# Patient Record
Sex: MALE | Race: WHITE | NOT HISPANIC OR LATINO | Employment: UNEMPLOYED | URBAN - METROPOLITAN AREA
[De-identification: names, ages, dates, MRNs, and addresses within clinical notes are randomized per-mention and may not be internally consistent; named-entity substitution may affect disease eponyms.]

---

## 2021-10-28 ENCOUNTER — TRANSCRIBE ORDERS (OUTPATIENT)
Dept: ADMINISTRATIVE | Facility: HOSPITAL | Age: 61
End: 2021-10-28

## 2021-10-28 ENCOUNTER — LAB (OUTPATIENT)
Dept: LAB | Facility: HOSPITAL | Age: 61
End: 2021-10-28

## 2021-10-28 ENCOUNTER — HOSPITAL ENCOUNTER (OUTPATIENT)
Dept: GENERAL RADIOLOGY | Facility: HOSPITAL | Age: 61
Discharge: HOME | End: 2021-10-28

## 2021-10-28 DIAGNOSIS — R53.81 MALAISE AND FATIGUE: Primary | ICD-10-CM

## 2021-10-28 DIAGNOSIS — R73.9 ELEVATED BLOOD SUGAR: ICD-10-CM

## 2021-10-28 DIAGNOSIS — R06.02 SHORTNESS OF BREATH: ICD-10-CM

## 2021-10-28 DIAGNOSIS — R53.81 MALAISE AND FATIGUE: ICD-10-CM

## 2021-10-28 DIAGNOSIS — R07.9 CHEST PAIN, UNSPECIFIED TYPE: ICD-10-CM

## 2021-10-28 DIAGNOSIS — R53.83 MALAISE AND FATIGUE: ICD-10-CM

## 2021-10-28 DIAGNOSIS — R53.83 MALAISE AND FATIGUE: Primary | ICD-10-CM

## 2021-10-28 DIAGNOSIS — R07.9 CHEST PAIN, UNSPECIFIED TYPE: Primary | ICD-10-CM

## 2021-10-28 LAB
ALBUMIN SERPL-MCNC: 4.5 G/DL (ref 3.5–5.2)
ALBUMIN/GLOB SERPL: 1.6 G/DL
ALP SERPL-CCNC: 84 U/L (ref 39–117)
ALT SERPL W P-5'-P-CCNC: 18 U/L (ref 1–41)
ANION GAP SERPL CALCULATED.3IONS-SCNC: 11.6 MMOL/L (ref 5–15)
AST SERPL-CCNC: 21 U/L (ref 1–40)
BASOPHILS # BLD AUTO: 0.05 10*3/MM3 (ref 0–0.2)
BASOPHILS NFR BLD AUTO: 0.6 % (ref 0–1.5)
BILIRUB SERPL-MCNC: 0.3 MG/DL (ref 0–1.2)
BUN SERPL-MCNC: 19 MG/DL (ref 8–23)
BUN/CREAT SERPL: 19.4 (ref 7–25)
CALCIUM SPEC-SCNC: 9.3 MG/DL (ref 8.6–10.5)
CHLORIDE SERPL-SCNC: 105 MMOL/L (ref 98–107)
CHOLEST SERPL-MCNC: 225 MG/DL (ref 0–200)
CO2 SERPL-SCNC: 23.4 MMOL/L (ref 22–29)
CREAT SERPL-MCNC: 0.98 MG/DL (ref 0.76–1.27)
DEPRECATED RDW RBC AUTO: 47 FL (ref 37–54)
EOSINOPHIL # BLD AUTO: 0.07 10*3/MM3 (ref 0–0.4)
EOSINOPHIL NFR BLD AUTO: 0.9 % (ref 0.3–6.2)
ERYTHROCYTE [DISTWIDTH] IN BLOOD BY AUTOMATED COUNT: 14.6 % (ref 12.3–15.4)
GFR SERPL CREATININE-BSD FRML MDRD: 78 ML/MIN/1.73
GLOBULIN UR ELPH-MCNC: 2.8 GM/DL
GLUCOSE SERPL-MCNC: 106 MG/DL (ref 65–99)
HBA1C MFR BLD: 5.59 % (ref 4.8–5.6)
HCT VFR BLD AUTO: 46.8 % (ref 37.5–51)
HDLC SERPL-MCNC: 30 MG/DL (ref 40–60)
HGB BLD-MCNC: 15.8 G/DL (ref 13–17.7)
IMM GRANULOCYTES # BLD AUTO: 0.02 10*3/MM3 (ref 0–0.05)
IMM GRANULOCYTES NFR BLD AUTO: 0.3 % (ref 0–0.5)
LDLC SERPL CALC-MCNC: 147 MG/DL (ref 0–100)
LDLC/HDLC SERPL: 4.75 {RATIO}
LYMPHOCYTES # BLD AUTO: 2.25 10*3/MM3 (ref 0.7–3.1)
LYMPHOCYTES NFR BLD AUTO: 28.1 % (ref 19.6–45.3)
MCH RBC QN AUTO: 29.7 PG (ref 26.6–33)
MCHC RBC AUTO-ENTMCNC: 33.8 G/DL (ref 31.5–35.7)
MCV RBC AUTO: 88 FL (ref 79–97)
MONOCYTES # BLD AUTO: 0.67 10*3/MM3 (ref 0.1–0.9)
MONOCYTES NFR BLD AUTO: 8.4 % (ref 5–12)
NEUTROPHILS NFR BLD AUTO: 4.94 10*3/MM3 (ref 1.7–7)
NEUTROPHILS NFR BLD AUTO: 61.7 % (ref 42.7–76)
NRBC BLD AUTO-RTO: 0 /100 WBC (ref 0–0.2)
PLATELET # BLD AUTO: 171 10*3/MM3 (ref 140–450)
PMV BLD AUTO: 12.2 FL (ref 6–12)
POTASSIUM SERPL-SCNC: 4.1 MMOL/L (ref 3.5–5.2)
PROT SERPL-MCNC: 7.3 G/DL (ref 6–8.5)
PSA SERPL-MCNC: 1.48 NG/ML (ref 0–4)
RBC # BLD AUTO: 5.32 10*6/MM3 (ref 4.14–5.8)
SODIUM SERPL-SCNC: 140 MMOL/L (ref 136–145)
T4 FREE SERPL-MCNC: 1.35 NG/DL (ref 0.93–1.7)
TRIGL SERPL-MCNC: 262 MG/DL (ref 0–150)
TSH SERPL DL<=0.05 MIU/L-ACNC: 1.85 UIU/ML (ref 0.27–4.2)
VLDLC SERPL-MCNC: 48 MG/DL (ref 5–40)
WBC # BLD AUTO: 8 10*3/MM3 (ref 3.4–10.8)

## 2021-10-28 PROCEDURE — G0103 PSA SCREENING: HCPCS

## 2021-10-28 PROCEDURE — 85025 COMPLETE CBC W/AUTO DIFF WBC: CPT

## 2021-10-28 PROCEDURE — 83036 HEMOGLOBIN GLYCOSYLATED A1C: CPT

## 2021-10-28 PROCEDURE — 84439 ASSAY OF FREE THYROXINE: CPT

## 2021-10-28 PROCEDURE — 84443 ASSAY THYROID STIM HORMONE: CPT

## 2021-10-28 PROCEDURE — 80061 LIPID PANEL: CPT

## 2021-10-28 PROCEDURE — 80053 COMPREHEN METABOLIC PANEL: CPT

## 2021-10-28 PROCEDURE — 36415 COLL VENOUS BLD VENIPUNCTURE: CPT

## 2021-10-28 PROCEDURE — 71046 X-RAY EXAM CHEST 2 VIEWS: CPT

## 2021-11-10 ENCOUNTER — OFFICE VISIT (OUTPATIENT)
Dept: FAMILY MEDICINE CLINIC | Facility: CLINIC | Age: 61
End: 2021-11-10

## 2021-11-10 VITALS
WEIGHT: 177 LBS | SYSTOLIC BLOOD PRESSURE: 144 MMHG | BODY MASS INDEX: 27.78 KG/M2 | OXYGEN SATURATION: 97 % | TEMPERATURE: 98 F | HEIGHT: 67 IN | DIASTOLIC BLOOD PRESSURE: 80 MMHG | HEART RATE: 80 BPM

## 2021-11-10 DIAGNOSIS — E78.2 MIXED HYPERLIPIDEMIA: Chronic | ICD-10-CM

## 2021-11-10 DIAGNOSIS — F17.219 CIGARETTE NICOTINE DEPENDENCE WITH NICOTINE-INDUCED DISORDER: Chronic | ICD-10-CM

## 2021-11-10 DIAGNOSIS — J44.1 COPD WITH EXACERBATION (HCC): Primary | Chronic | ICD-10-CM

## 2021-11-10 DIAGNOSIS — K40.90 NON-RECURRENT UNILATERAL INGUINAL HERNIA WITHOUT OBSTRUCTION OR GANGRENE: Chronic | ICD-10-CM

## 2021-11-10 DIAGNOSIS — H61.92 LESION OF LEFT EARLOBE: Chronic | ICD-10-CM

## 2021-11-10 PROBLEM — K46.9 ABDOMINAL HERNIA: Chronic | Status: RESOLVED | Noted: 2021-11-10 | Resolved: 2021-11-10

## 2021-11-10 PROBLEM — K46.9 ABDOMINAL HERNIA: Chronic | Status: ACTIVE | Noted: 2021-11-10

## 2021-11-10 PROCEDURE — 99204 OFFICE O/P NEW MOD 45 MIN: CPT | Performed by: FAMILY MEDICINE

## 2021-11-10 NOTE — PROGRESS NOTES
"Chief Complaint  Establish Care, Hyperlipidemia (labs checked 10-), heart flutters (when eating), and Shortness of Breath (started in 2019 ,pt feels like its getting better)     Subjective          Vijay Mathew presents to [facility]  He is here today to establish relations as a new patient. He has a PMH significant for COPD and tobacco abuse. He is recently been seen by ARELIS Castillo. He does not have a past family history of cancer. He has smoked for over 40 years.     He has been having SOB and for several weeks. He did have covid a few months ago. He says that he feels the SOB with exertion. He denies chest pain or pressure.     He is complaining of a lesion behind his left ear this been present now for over 10 years.  He says he has a family history of these as well.    He is complaining of a swelling in his scrotal region that is been present now for the last for 5 years.  He says he started to have increasing pain and is wanting to consider having it repaired.  He denies issues with his bowels or intense pain.    The patient has no other complaints today and denies chest pain, shortness of breath, weakness, numbness, nausea, vomiting, diarrhea, dizziness or syncopal event.        Objective   Vital Signs:   /80 (BP Location: Left arm, Patient Position: Sitting, Cuff Size: Adult)   Pulse 80   Temp 98 °F (36.7 °C) (Temporal)   Ht 170.2 cm (67\")   Wt 80.3 kg (177 lb)   SpO2 97%   BMI 27.72 kg/m²     Physical Exam  Vitals reviewed.   Constitutional:       Appearance: Normal appearance. He is well-developed.   HENT:      Head: Normocephalic and atraumatic.        Comments: 7.5  Cm circular lesion     Right Ear: External ear normal.      Left Ear: External ear normal.      Mouth/Throat:      Pharynx: No oropharyngeal exudate.   Eyes:      Conjunctiva/sclera: Conjunctivae normal.      Pupils: Pupils are equal, round, and reactive to light.   Neck:      " Vascular: No carotid bruit.   Cardiovascular:      Rate and Rhythm: Normal rate and regular rhythm.      Heart sounds: No murmur heard.  No friction rub. No gallop.    Pulmonary:      Effort: Pulmonary effort is normal.      Breath sounds: Normal breath sounds. No wheezing or rhonchi.   Abdominal:      General: Bowel sounds are normal. There is no distension.      Palpations: Abdomen is soft.      Tenderness: There is no abdominal tenderness.   Genitourinary:         Comments: Enlarged scrotum    Skin:     General: Skin is warm and dry.   Neurological:      Mental Status: He is alert and oriented to person, place, and time.      Cranial Nerves: No cranial nerve deficit.      Motor: No weakness.   Psychiatric:         Mood and Affect: Mood and affect normal.         Behavior: Behavior normal.         Thought Content: Thought content normal.         Judgment: Judgment normal.        Result Review :     CMP    CMP 10/28/21   Glucose 106 (A)   BUN 19   Creatinine 0.98   eGFR Non African Am 78   Sodium 140   Potassium 4.1   Chloride 105   Calcium 9.3   Albumin 4.50   Total Bilirubin 0.3   Alkaline Phosphatase 84   AST (SGOT) 21   ALT (SGPT) 18   (A) Abnormal value            CBC    CBC 10/28/21   WBC 8.00   RBC 5.32   Hemoglobin 15.8   Hematocrit 46.8   MCV 88.0   MCH 29.7   MCHC 33.8   RDW 14.6   Platelets 171           Lipid Panel    Lipid Panel 10/28/21   Total Cholesterol 225 (A)   Triglycerides 262 (A)   HDL Cholesterol 30 (A)   VLDL Cholesterol 48 (A)   LDL Cholesterol  147 (A)   LDL/HDL Ratio 4.75   (A) Abnormal value            TSH    TSH 10/28/21   TSH 1.850                     Assessment and Plan    Diagnoses and all orders for this visit:    1. COPD with exacerbation (HCC) (Primary)  Assessment & Plan:  COPD is newly identified.  Counseled to avoid exposure to cigarette smoke.          2. Cigarette nicotine dependence with nicotine-induced disorder    3. Mixed hyperlipidemia  Assessment & Plan:  Lipid  abnormalities are improving with lifestyle modifications.  Nutritional counseling was provided.  Lipids will be reassessed in 3 months.    The 10-year ASCVD risk score (Olivehurstjohn TEJADA Jr., et al., 2013) is: 27.2%    Values used to calculate the score:      Age: 61 years      Sex: Male      Is Non- : No      Diabetic: No      Tobacco smoker: Yes      Systolic Blood Pressure: 144 mmHg      Is BP treated: No      HDL Cholesterol: 30 mg/dL      Total Cholesterol: 225 mg/dL        I spent 54 minutes caring for Vijay on this date of service. This time includes time spent by me in the following activities:preparing for the visit, reviewing tests, performing a medically appropriate examination and/or evaluation , counseling and educating the patient/family/caregiver, ordering medications, tests, or procedures and documenting information in the medical record  Follow Up   Return in about 1 month (around 12/10/2021).  Patient was given instructions and counseling regarding his condition or for health maintenance advice. Please see specific information pulled into the AVS if appropriate.

## 2021-11-10 NOTE — ASSESSMENT & PLAN NOTE
The patient's hernia has never been seen or treated by a a provider in the past.  He was educated about the risks of possible incarcerated bowel.  He verbalized understanding and says this is soon as he gets insurance he is willing to get it treated.

## 2021-11-10 NOTE — ASSESSMENT & PLAN NOTE
Lipid abnormalities are improving with lifestyle modifications.  Nutritional counseling was provided.  Lipids will be reassessed in 3 months.    The 10-year ASCVD risk score (Sunitha TEJADA Jr., et al., 2013) is: 27.2%    Values used to calculate the score:      Age: 61 years      Sex: Male      Is Non- : No      Diabetic: No      Tobacco smoker: Yes      Systolic Blood Pressure: 144 mmHg      Is BP treated: No      HDL Cholesterol: 30 mg/dL      Total Cholesterol: 225 mg/dL

## 2021-11-10 NOTE — ASSESSMENT & PLAN NOTE
Tobacco use is newly identified.  Smoking cessation counseling was provided.  Tobacco use will be reassessed at the next regular appointment.

## 2021-11-10 NOTE — ASSESSMENT & PLAN NOTE
The patient was educated about the different possible pathologies of his ear lesion.  He said he is willing to go see a general surgeon or ENT doctor once he gets his insurance started here in few days.

## 2021-12-08 ENCOUNTER — OFFICE VISIT (OUTPATIENT)
Dept: FAMILY MEDICINE CLINIC | Facility: CLINIC | Age: 61
End: 2021-12-08

## 2021-12-08 VITALS
OXYGEN SATURATION: 98 % | BODY MASS INDEX: 28.69 KG/M2 | HEIGHT: 67 IN | HEART RATE: 74 BPM | TEMPERATURE: 97.4 F | WEIGHT: 182.8 LBS | RESPIRATION RATE: 18 BRPM | DIASTOLIC BLOOD PRESSURE: 74 MMHG | SYSTOLIC BLOOD PRESSURE: 147 MMHG

## 2021-12-08 DIAGNOSIS — K40.90 NON-RECURRENT UNILATERAL INGUINAL HERNIA WITHOUT OBSTRUCTION OR GANGRENE: Primary | ICD-10-CM

## 2021-12-08 DIAGNOSIS — E78.2 MIXED HYPERLIPIDEMIA: Chronic | ICD-10-CM

## 2021-12-08 DIAGNOSIS — F17.219 CIGARETTE NICOTINE DEPENDENCE WITH NICOTINE-INDUCED DISORDER: Chronic | ICD-10-CM

## 2021-12-08 DIAGNOSIS — H61.92 LESION OF LEFT EARLOBE: Chronic | ICD-10-CM

## 2021-12-08 DIAGNOSIS — J44.9 COPD, MILD (HCC): Chronic | ICD-10-CM

## 2021-12-08 PROBLEM — J44.1 COPD WITH EXACERBATION (HCC): Chronic | Status: RESOLVED | Noted: 2021-11-10 | Resolved: 2021-12-08

## 2021-12-08 PROCEDURE — 99214 OFFICE O/P EST MOD 30 MIN: CPT | Performed by: FAMILY MEDICINE

## 2021-12-08 RX ORDER — LOSARTAN POTASSIUM 25 MG/1
25 TABLET ORAL DAILY
Qty: 30 TABLET | Refills: 2 | Status: SHIPPED | OUTPATIENT
Start: 2021-12-08 | End: 2021-12-31 | Stop reason: SDUPTHER

## 2021-12-08 RX ORDER — ATORVASTATIN CALCIUM 10 MG/1
10 TABLET, FILM COATED ORAL DAILY
Qty: 30 TABLET | Refills: 2 | Status: SHIPPED | OUTPATIENT
Start: 2021-12-08 | End: 2022-02-23 | Stop reason: SDUPTHER

## 2021-12-08 NOTE — PROGRESS NOTES
"Chief Complaint  Follow-up and Hyperlipidemia    Subjective          Vijay Mathew presents to [facility]  He is here today for a follow-up for his chronic medical conditions. He has a PMH significant for COPD and tobacco abuse. He is recently been seen by ARELIS Castillo. He does not have a past family history of cancer. He has smoked for over 40 years.      He is still complaining of a lesion behind his left ear this been present now for over 10 years.  He says he has a family history of these as well.     He is still complaining of a swelling in his scrotal region that is been present now for the last for 5 years.  He says he started to have increasing pain and is wanting to consider having it repaired.  He denies issues with his bowels or intense pain.    He is trying to cut back smoking.      The patient has no other complaints today and denies chest pain, shortness of breath, weakness, numbness, nausea, vomiting, diarrhea, dizziness or syncopal event.      Objective   Vital Signs:   /74 (BP Location: Left arm, Patient Position: Sitting)   Pulse 74   Temp 97.4 °F (36.3 °C)   Resp 18   Ht 170.2 cm (67\")   Wt 82.9 kg (182 lb 12.8 oz)   SpO2 98%   BMI 28.63 kg/m²     Physical Exam  Vitals reviewed.   Constitutional:       Appearance: Normal appearance. He is well-developed.   HENT:      Head: Normocephalic and atraumatic.      Right Ear: External ear normal.      Left Ear: External ear normal.      Mouth/Throat:      Pharynx: No oropharyngeal exudate.   Eyes:      Conjunctiva/sclera: Conjunctivae normal.      Pupils: Pupils are equal, round, and reactive to light.   Neck:      Vascular: No carotid bruit.   Cardiovascular:      Rate and Rhythm: Normal rate and regular rhythm.      Heart sounds: No murmur heard.  No friction rub. No gallop.    Pulmonary:      Effort: Pulmonary effort is normal.      Breath sounds: Normal breath sounds. No wheezing or rhonchi. "   Abdominal:      General: Bowel sounds are normal. There is no distension.      Palpations: Abdomen is soft.      Tenderness: There is no abdominal tenderness.   Skin:     General: Skin is warm and dry.   Neurological:      Mental Status: He is alert and oriented to person, place, and time.      Cranial Nerves: No cranial nerve deficit.      Motor: No weakness.   Psychiatric:         Mood and Affect: Mood and affect normal.         Behavior: Behavior normal.         Thought Content: Thought content normal.         Judgment: Judgment normal.        Result Review :     CMP    CMP 10/28/21   Glucose 106 (A)   BUN 19   Creatinine 0.98   eGFR Non African Am 78   Sodium 140   Potassium 4.1   Chloride 105   Calcium 9.3   Albumin 4.50   Total Bilirubin 0.3   Alkaline Phosphatase 84   AST (SGOT) 21   ALT (SGPT) 18   (A) Abnormal value            CBC    CBC 10/28/21   WBC 8.00   RBC 5.32   Hemoglobin 15.8   Hematocrit 46.8   MCV 88.0   MCH 29.7   MCHC 33.8   RDW 14.6   Platelets 171           Lipid Panel    Lipid Panel 10/28/21   Total Cholesterol 225 (A)   Triglycerides 262 (A)   HDL Cholesterol 30 (A)   VLDL Cholesterol 48 (A)   LDL Cholesterol  147 (A)   LDL/HDL Ratio 4.75   (A) Abnormal value            TSH    TSH 10/28/21   TSH 1.850                     Assessment and Plan    Diagnoses and all orders for this visit:    1. Non-recurrent unilateral inguinal hernia without obstruction or gangrene (Primary)  Assessment & Plan:  He is amendable to see general surgery for evaluation and repair of his hernia. A referral was placed to Dr Cline.     Orders:  -     Cancel: Ambulatory Referral to General Surgery  -     Ambulatory Referral to General Surgery    2. Mixed hyperlipidemia  Assessment & Plan:  Lipid abnormalities are newly identified.  He was started on atorvastatin 10 mg daily.  Lipids will be reassessed in 3 months.    The 10-year ASCVD risk score (Everett MALLORY Jr., et al., 2013) is: 28%    Values used to calculate the  score:      Age: 61 years      Sex: Male      Is Non- : No      Diabetic: No      Tobacco smoker: Yes      Systolic Blood Pressure: 147 mmHg      Is BP treated: No      HDL Cholesterol: 30 mg/dL      Total Cholesterol: 225 mg/dL        3. Cigarette nicotine dependence with nicotine-induced disorder  Assessment & Plan:  Tobacco use is improving with lifestyle modifications.  Smoking cessation counseling was provided.  Tobacco use will be reassessed at the next regular appointment.      4. COPD, mild (HCC)  Assessment & Plan:  COPD is improving with lifestyle modifications.  COPD information handout given.          5. Lesion of left earlobe  Assessment & Plan:  He was given a referral to general surgery for further medical evaluation and management.     Orders:  -     Ambulatory Referral to General Surgery    Other orders  -     atorvastatin (LIPITOR) 10 MG tablet; Take 1 tablet by mouth Daily.  Dispense: 30 tablet; Refill: 2  -     losartan (Cozaar) 25 MG tablet; Take 1 tablet by mouth Daily.  Dispense: 30 tablet; Refill: 2      Follow Up   Return in about 6 weeks (around 1/19/2022).  Patient was given instructions and counseling regarding his condition or for health maintenance advice. Please see specific information pulled into the AVS if appropriate.

## 2021-12-08 NOTE — ASSESSMENT & PLAN NOTE
Lipid abnormalities are newly identified.  He was started on atorvastatin 10 mg daily.  Lipids will be reassessed in 3 months.    The 10-year ASCVD risk score (Sunitha TEJADA Jr., et al., 2013) is: 28%    Values used to calculate the score:      Age: 61 years      Sex: Male      Is Non- : No      Diabetic: No      Tobacco smoker: Yes      Systolic Blood Pressure: 147 mmHg      Is BP treated: No      HDL Cholesterol: 30 mg/dL      Total Cholesterol: 225 mg/dL

## 2021-12-08 NOTE — ASSESSMENT & PLAN NOTE
He is amendable to see general surgery for evaluation and repair of his hernia. A referral was placed to Dr Cline.

## 2021-12-31 ENCOUNTER — TELEPHONE (OUTPATIENT)
Dept: FAMILY MEDICINE CLINIC | Facility: CLINIC | Age: 61
End: 2021-12-31

## 2021-12-31 RX ORDER — LOSARTAN POTASSIUM 50 MG/1
50 TABLET ORAL DAILY
Qty: 90 TABLET | Refills: 1 | Status: SHIPPED | OUTPATIENT
Start: 2021-12-31 | End: 2022-01-26 | Stop reason: SDUPTHER

## 2021-12-31 NOTE — TELEPHONE ENCOUNTER
The patient called and stated he is taking two 25 mg losartan daily and is needing a new prescription.

## 2022-01-21 ENCOUNTER — PREP FOR SURGERY (OUTPATIENT)
Dept: OTHER | Facility: HOSPITAL | Age: 62
End: 2022-01-21

## 2022-01-21 ENCOUNTER — OFFICE VISIT (OUTPATIENT)
Dept: SURGERY | Facility: CLINIC | Age: 62
End: 2022-01-21

## 2022-01-21 VITALS — RESPIRATION RATE: 16 BRPM | WEIGHT: 179 LBS | HEIGHT: 67 IN | BODY MASS INDEX: 28.09 KG/M2

## 2022-01-21 DIAGNOSIS — K40.90 LEFT INGUINAL HERNIA: Primary | ICD-10-CM

## 2022-01-21 DIAGNOSIS — R22.1 MASS OF LEFT SIDE OF NECK: ICD-10-CM

## 2022-01-21 PROCEDURE — 99203 OFFICE O/P NEW LOW 30 MIN: CPT | Performed by: SURGERY

## 2022-01-21 RX ORDER — CEFAZOLIN SODIUM 2 G/100ML
2 INJECTION, SOLUTION INTRAVENOUS ONCE
Status: CANCELLED | OUTPATIENT
Start: 2022-01-21 | End: 2022-01-21

## 2022-01-21 NOTE — PROGRESS NOTES
Chief Complaint:  Hernia (inguinal)    Primary Care Provider: Amaya Robbins DO    Referring Provider: Amaya Robbins DO    History of Present Illness  Vijay Mathew is a 62 y.o. male referred by Amaya Robbins DO for an inguinal hernia.  The patient has a very large left inguinal hernia that has been present for at least 30 years.  Patient does not have a lot of pain where the bulge is located.  The bulge used to be reducible but for the past 3 to 4 months the patient has been and able to get the bulge to reduce.  No obstructive symptoms.  The bulge is so large it makes it difficult for the patient to urinate.  He has to wear very loose pants because the bulge is so large.  No prior abdominal surgeries.  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  No known heart problems besides high blood pressure.  No heart attack or stroke.    Allergies: Patient has no known allergies.    Outpatient Medications Marked as Taking for the 1/21/22 encounter (Office Visit) with Brad Cline MD   Medication Sig Dispense Refill   • Ascorbic Acid (VITAMIN C PO) Take  by mouth.     • atorvastatin (LIPITOR) 10 MG tablet Take 1 tablet by mouth Daily. 30 tablet 2   • losartan (Cozaar) 50 MG tablet Take 1 tablet by mouth Daily. 90 tablet 1   • Misc Natural Products (APPLE CIDER VINEGAR DIET PO) Take  by mouth.     • Multiple Vitamins-Minerals (EMERGEN-C IMMUNE PLUS PO) Take  by mouth.     • VITAMIN D PO Take  by mouth.     • ZINC CITRATE PO Take  by mouth.         Past Medical History:   • Broken bones   • History of deafness   • History of kidney stones   • History of rheumatic fever   • Hyperlipidemia   • Shortness of breath   • Sinus trouble        No past surgical history on file.    Family History: History reviewed. No pertinent family history.     Social History:  Social History     Tobacco Use   • Smoking status: Current  "Every Day Smoker     Packs/day: 1.50     Years: 45.00     Pack years: 67.50     Start date: 1977   • Smokeless tobacco: Never Used   Substance Use Topics   • Alcohol use: Not Currently       Objective     Vital Signs:  Resp 16   Ht 170.2 cm (67\")   Wt 81.2 kg (179 lb)   BMI 28.04 kg/m²   • Constitutional: here alone, alert, no acute distress, reliable historian  • HENT:  NCAT, no visible deformities or lesions.  Large soft subcutaneous mass just posterior to left ear  • Eyes:  sclerae clear, conjunctivae clear, EOMI  • Neck:  normal appearance, no masses, trachea midline  • Respiratory:  breathing not labored, respiratory effort appears normal  • Cardiovascular:  heart regular rate  • Abdomen:  soft, nontender, nondistended, Extremely large bulge at the left inguinal area that extends into the left scrotum.  The bulge is so large that I cannot palpate the left testicle.  The bulge is soft and the bulge is not reducible  • Skin and subcutaneous tissue:  no visible concerning rashes or lesions, no jaundice  • Musculoskeletal: moving all extremities symmetrically and purposefully  • Neurologic:  no obvious motor or sensory deficits, normal gait, able to stand without difficulty, cerebellar function without any obvious abnormalities, alert & oriented x 3, speech clear  • Psychiatric:  judgment and insight intact, mood normal, affect appropriate, cooperative    Assessment:  1. Left inguinoscrotal hernia  2. Mass of left side of neck - benign exam characteristics    Plan:  Robotic left inguinal hernia repair.  Will excise left sided neck mass at a later time after the patient recovers from inguinal hernia surgery.  Although the inguinal scrotal mass has characteristics consistent with a large hernia, given the very large size of the hernia will evaluate with a CT pelvis to rule out mass/any finding other than large hernia.    Discussion: Indications, options, risk, benefits, and expected outcomes of planned surgery " were discussed with the patient and he agrees to proceed.  Patient fully understands that I may need to remove his left testicle given the extremely large size of the hernia.  He also understands there is a possibility the surgery may need to be converted to an open procedure.    Brad Cline MD  01/21/2022    Electronically signed by Brad Cline MD, 01/21/22, 2:29 PM EST.

## 2022-01-26 ENCOUNTER — OFFICE VISIT (OUTPATIENT)
Dept: FAMILY MEDICINE CLINIC | Facility: CLINIC | Age: 62
End: 2022-01-26

## 2022-01-26 VITALS
WEIGHT: 179.6 LBS | HEART RATE: 82 BPM | OXYGEN SATURATION: 98 % | HEIGHT: 67 IN | TEMPERATURE: 97.2 F | SYSTOLIC BLOOD PRESSURE: 142 MMHG | DIASTOLIC BLOOD PRESSURE: 74 MMHG | RESPIRATION RATE: 18 BRPM | BODY MASS INDEX: 28.19 KG/M2

## 2022-01-26 DIAGNOSIS — E78.2 MIXED HYPERLIPIDEMIA: Chronic | ICD-10-CM

## 2022-01-26 DIAGNOSIS — F17.219 CIGARETTE NICOTINE DEPENDENCE WITH NICOTINE-INDUCED DISORDER: Chronic | ICD-10-CM

## 2022-01-26 DIAGNOSIS — I10 PRIMARY HYPERTENSION: Primary | Chronic | ICD-10-CM

## 2022-01-26 PROCEDURE — 99214 OFFICE O/P EST MOD 30 MIN: CPT | Performed by: FAMILY MEDICINE

## 2022-01-26 RX ORDER — LOSARTAN POTASSIUM 100 MG/1
100 TABLET ORAL DAILY
Qty: 90 TABLET | Refills: 1 | Status: SHIPPED | OUTPATIENT
Start: 2022-01-26 | End: 2022-04-07 | Stop reason: SDUPTHER

## 2022-01-26 NOTE — PROGRESS NOTES
"Chief Complaint  Hypertension    Subjective          Vijay Mathew presents to [facility]  He is here today for a follow-up for his chronic medical conditions. He has a PMH significant for COPD and tobacco abuse. He is recently been seen by ARELIS Castillo. He does not have a past family history of cancer. He has smoked for over 40 years.      His home blood pressure is running 135/85.     He is trying to cut back smoking.      The patient has no other complaints today and denies chest pain, shortness of breath, weakness, numbness, nausea, vomiting, diarrhea, dizziness or syncopal event.      Objective   Vital Signs:   /74 (BP Location: Left arm, Patient Position: Sitting)   Pulse 82   Temp 97.2 °F (36.2 °C)   Resp 18   Ht 170.2 cm (67.01\")   Wt 81.5 kg (179 lb 9.6 oz)   SpO2 98%   BMI 28.12 kg/m²     Physical Exam  Vitals reviewed.   Constitutional:       Appearance: Normal appearance. He is well-developed.   HENT:      Head: Normocephalic and atraumatic.      Right Ear: External ear normal.      Left Ear: External ear normal.      Mouth/Throat:      Pharynx: No oropharyngeal exudate.   Eyes:      Conjunctiva/sclera: Conjunctivae normal.      Pupils: Pupils are equal, round, and reactive to light.   Neck:      Vascular: No carotid bruit.   Cardiovascular:      Rate and Rhythm: Normal rate and regular rhythm.      Heart sounds: No murmur heard.  No friction rub. No gallop.    Pulmonary:      Effort: Pulmonary effort is normal.      Breath sounds: Normal breath sounds. No wheezing or rhonchi.   Abdominal:      General: Bowel sounds are normal. There is no distension.      Palpations: Abdomen is soft.      Tenderness: There is no abdominal tenderness.   Skin:     General: Skin is warm and dry.   Neurological:      Mental Status: He is alert and oriented to person, place, and time.      Cranial Nerves: No cranial nerve deficit.      Motor: No weakness.   Psychiatric: "         Mood and Affect: Mood and affect normal.         Behavior: Behavior normal.         Thought Content: Thought content normal.         Judgment: Judgment normal.        Result Review :     CMP    CMP 10/28/21   Glucose 106 (A)   BUN 19   Creatinine 0.98   eGFR Non African Am 78   Sodium 140   Potassium 4.1   Chloride 105   Calcium 9.3   Albumin 4.50   Total Bilirubin 0.3   Alkaline Phosphatase 84   AST (SGOT) 21   ALT (SGPT) 18   (A) Abnormal value            CBC    CBC 10/28/21   WBC 8.00   RBC 5.32   Hemoglobin 15.8   Hematocrit 46.8   MCV 88.0   MCH 29.7   MCHC 33.8   RDW 14.6   Platelets 171           Lipid Panel    Lipid Panel 10/28/21   Total Cholesterol 225 (A)   Triglycerides 262 (A)   HDL Cholesterol 30 (A)   VLDL Cholesterol 48 (A)   LDL Cholesterol  147 (A)   LDL/HDL Ratio 4.75   (A) Abnormal value            TSH    TSH 10/28/21   TSH 1.850                     Assessment and Plan    Diagnoses and all orders for this visit:    1. Primary hypertension (Primary)  Assessment & Plan:  Hypertension is improving with treatment.  Continue current treatment regimen.  Dietary sodium restriction.  Weight loss.  Blood pressure will be reassessed at the next regular appointment.      2. Mixed hyperlipidemia  Assessment & Plan:  Lipid abnormalities are improving with treatment.  Nutritional counseling was provided. and Pharmacotherapy as ordered.  Lipids will be reassessed in 3 months.      3. Cigarette nicotine dependence with nicotine-induced disorder  Assessment & Plan:  Tobacco use is unchanged.  Smoking cessation counseling was provided.  Tobacco use will be reassessed in 3 months.    He was encouraged to stop smoking when he has his surgery.       Other orders  -     losartan (Cozaar) 100 MG tablet; Take 1 tablet by mouth Daily.  Dispense: 90 tablet; Refill: 1      Follow Up   No follow-ups on file.  Patient was given instructions and counseling regarding his condition or for health maintenance advice.  Please see specific information pulled into the AVS if appropriate.

## 2022-01-26 NOTE — ASSESSMENT & PLAN NOTE
Tobacco use is unchanged.  Smoking cessation counseling was provided.  Tobacco use will be reassessed in 3 months.    He was encouraged to stop smoking when he has his surgery.

## 2022-02-07 ENCOUNTER — TELEPHONE (OUTPATIENT)
Dept: SURGERY | Facility: CLINIC | Age: 62
End: 2022-02-07

## 2022-02-07 DIAGNOSIS — K40.90 LEFT INGUINAL HERNIA: Primary | ICD-10-CM

## 2022-02-07 NOTE — TELEPHONE ENCOUNTER
CT scan scheduled on 2-22-22 at 2pm at [facility]. Pt is to be NPO for 2 hours prior to scan. Tried calling pt, no answer. Cannot leave message.

## 2022-02-18 ENCOUNTER — TELEPHONE (OUTPATIENT)
Dept: SURGERY | Facility: CLINIC | Age: 62
End: 2022-02-18

## 2022-02-18 NOTE — TELEPHONE ENCOUNTER
Patient had Covid vaccine on 01/27 and second scheduled 02/24.  Should he go ahead and get it, or wait, since he is scheduled for surgery.

## 2022-02-22 ENCOUNTER — HOSPITAL ENCOUNTER (OUTPATIENT)
Dept: CT IMAGING | Facility: HOSPITAL | Age: 62
Discharge: HOME | End: 2022-02-22
Admitting: SURGERY

## 2022-02-22 DIAGNOSIS — K40.90 LEFT INGUINAL HERNIA: ICD-10-CM

## 2022-02-22 LAB — CREAT BLDA-MCNC: 1 MG/DL

## 2022-02-22 PROCEDURE — 82565 ASSAY OF CREATININE: CPT

## 2022-02-22 PROCEDURE — 0 IOPAMIDOL PER 1 ML: Performed by: SURGERY

## 2022-02-22 PROCEDURE — 72193 CT PELVIS W/DYE: CPT

## 2022-02-22 RX ADMIN — IOPAMIDOL 100 ML: 755 INJECTION, SOLUTION INTRAVENOUS at 14:38

## 2022-02-23 RX ORDER — ATORVASTATIN CALCIUM 10 MG/1
10 TABLET, FILM COATED ORAL DAILY
Qty: 30 TABLET | Refills: 2 | Status: SHIPPED | OUTPATIENT
Start: 2022-02-23 | End: 2022-05-27 | Stop reason: SDUPTHER

## 2022-02-23 NOTE — TELEPHONE ENCOUNTER
Caller: Vijay Mathew    Relationship: Self    Best call back number: [phone]    Requested Prescriptions:   Requested Prescriptions     Pending Prescriptions Disp Refills   • atorvastatin (LIPITOR) 10 MG tablet 30 tablet 2     Sig: Take 1 tablet by mouth Daily.        Pharmacy where request should be sent:    [place], [place] - [address] [place] [address] DRIVE - [phone]  - [phone] FX  [phone]    Does the patient have less than a 3 day supply:  [x] Yes  [] No    Seamus Estrada Rep   02/23/22 16:01 EST

## 2022-02-28 ENCOUNTER — PRE-ADMISSION TESTING (OUTPATIENT)
Dept: PREADMISSION TESTING | Facility: HOSPITAL | Age: 62
End: 2022-02-28

## 2022-02-28 ENCOUNTER — LAB (OUTPATIENT)
Dept: LAB | Facility: HOSPITAL | Age: 62
End: 2022-02-28

## 2022-02-28 VITALS
WEIGHT: 183.2 LBS | SYSTOLIC BLOOD PRESSURE: 122 MMHG | OXYGEN SATURATION: 97 % | TEMPERATURE: 98.5 F | HEART RATE: 87 BPM | HEIGHT: 67 IN | BODY MASS INDEX: 28.75 KG/M2 | DIASTOLIC BLOOD PRESSURE: 74 MMHG | RESPIRATION RATE: 16 BRPM

## 2022-02-28 DIAGNOSIS — K40.90 LEFT INGUINAL HERNIA: ICD-10-CM

## 2022-02-28 DIAGNOSIS — Z01.818 PREOP TESTING: Primary | ICD-10-CM

## 2022-02-28 LAB — QT INTERVAL: 364 MS

## 2022-02-28 PROCEDURE — 93005 ELECTROCARDIOGRAM TRACING: CPT

## 2022-02-28 PROCEDURE — 93010 ELECTROCARDIOGRAM REPORT: CPT | Performed by: INTERNAL MEDICINE

## 2022-02-28 PROCEDURE — U0004 COV-19 TEST NON-CDC HGH THRU: HCPCS

## 2022-02-28 PROCEDURE — C9803 HOPD COVID-19 SPEC COLLECT: HCPCS

## 2022-02-28 RX ORDER — ASPIRIN 81 MG/1
81 TABLET ORAL DAILY PRN
COMMUNITY
End: 2022-04-07

## 2022-03-01 LAB — SARS-COV-2 RNA PNL SPEC NAA+PROBE: NOT DETECTED

## 2022-03-04 ENCOUNTER — HOSPITAL ENCOUNTER (OUTPATIENT)
Facility: HOSPITAL | Age: 62
Discharge: HOME | End: 2022-03-04
Attending: SURGERY | Admitting: SURGERY

## 2022-03-04 ENCOUNTER — ANESTHESIA EVENT (OUTPATIENT)
Dept: PERIOP | Facility: HOSPITAL | Age: 62
End: 2022-03-04

## 2022-03-04 ENCOUNTER — ANESTHESIA (OUTPATIENT)
Dept: PERIOP | Facility: HOSPITAL | Age: 62
End: 2022-03-04

## 2022-03-04 VITALS
BODY MASS INDEX: 27.61 KG/M2 | OXYGEN SATURATION: 96 % | DIASTOLIC BLOOD PRESSURE: 80 MMHG | SYSTOLIC BLOOD PRESSURE: 133 MMHG | WEIGHT: 175.93 LBS | HEIGHT: 67 IN | TEMPERATURE: 97.3 F | RESPIRATION RATE: 15 BRPM | HEART RATE: 80 BPM

## 2022-03-04 DIAGNOSIS — K40.90 LEFT INGUINAL HERNIA: ICD-10-CM

## 2022-03-04 PROCEDURE — 49650 LAP ING HERNIA REPAIR INIT: CPT | Performed by: SURGERY

## 2022-03-04 PROCEDURE — C1781 MESH (IMPLANTABLE): HCPCS | Performed by: SURGERY

## 2022-03-04 PROCEDURE — 25010000002 CEFAZOLIN PER 500 MG: Performed by: NURSE ANESTHETIST, CERTIFIED REGISTERED

## 2022-03-04 PROCEDURE — C1889 IMPLANT/INSERT DEVICE, NOC: HCPCS | Performed by: SURGERY

## 2022-03-04 PROCEDURE — 25010000002 HYDROMORPHONE PER 4 MG: Performed by: NURSE ANESTHETIST, CERTIFIED REGISTERED

## 2022-03-04 PROCEDURE — 25010000002 MIDAZOLAM PER 1 MG: Performed by: ANESTHESIOLOGY

## 2022-03-04 PROCEDURE — 25010000002 PROPOFOL 10 MG/ML EMULSION: Performed by: NURSE ANESTHETIST, CERTIFIED REGISTERED

## 2022-03-04 PROCEDURE — 25010000002 DEXAMETHASONE PER 1 MG: Performed by: NURSE ANESTHETIST, CERTIFIED REGISTERED

## 2022-03-04 PROCEDURE — 25010000002 CEFAZOLIN IN DEXTROSE 2-4 GM/100ML-% SOLUTION: Performed by: SURGERY

## 2022-03-04 PROCEDURE — 25010000002 ONDANSETRON PER 1 MG: Performed by: NURSE ANESTHETIST, CERTIFIED REGISTERED

## 2022-03-04 PROCEDURE — 49650 LAP ING HERNIA REPAIR INIT: CPT | Performed by: SPECIALIST/TECHNOLOGIST, OTHER

## 2022-03-04 DEVICE — MESH PROGRIP LAP S/FIX ABS 10X15CM BX/2: Type: IMPLANTABLE DEVICE | Site: INGUINAL | Status: FUNCTIONAL

## 2022-03-04 DEVICE — MESH LAP PROGRIP SLF/FIX FLT/SHET 12X16CM BX/1: Type: IMPLANTABLE DEVICE | Site: INGUINAL | Status: FUNCTIONAL

## 2022-03-04 DEVICE — ABSORBABLE WOUND CLOSURE DEVICE
Type: IMPLANTABLE DEVICE | Site: INGUINAL | Status: FUNCTIONAL
Brand: V-LOC 180

## 2022-03-04 RX ORDER — HYDROMORPHONE HCL 110MG/55ML
PATIENT CONTROLLED ANALGESIA SYRINGE INTRAVENOUS AS NEEDED
Status: DISCONTINUED | OUTPATIENT
Start: 2022-03-04 | End: 2022-03-04 | Stop reason: SURG

## 2022-03-04 RX ORDER — DEXAMETHASONE SODIUM PHOSPHATE 4 MG/ML
INJECTION, SOLUTION INTRA-ARTICULAR; INTRALESIONAL; INTRAMUSCULAR; INTRAVENOUS; SOFT TISSUE AS NEEDED
Status: DISCONTINUED | OUTPATIENT
Start: 2022-03-04 | End: 2022-03-04 | Stop reason: SURG

## 2022-03-04 RX ORDER — MAGNESIUM HYDROXIDE 1200 MG/15ML
LIQUID ORAL AS NEEDED
Status: DISCONTINUED | OUTPATIENT
Start: 2022-03-04 | End: 2022-03-04 | Stop reason: HOSPADM

## 2022-03-04 RX ORDER — OXYCODONE HYDROCHLORIDE AND ACETAMINOPHEN 5; 325 MG/1; MG/1
1-2 TABLET ORAL EVERY 4 HOURS PRN
Qty: 20 TABLET | Refills: 0 | Status: SHIPPED | OUTPATIENT
Start: 2022-03-04 | End: 2022-04-07

## 2022-03-04 RX ORDER — CEPHALEXIN 500 MG/1
500 CAPSULE ORAL 3 TIMES DAILY
Qty: 21 CAPSULE | Refills: 0 | Status: SHIPPED | OUTPATIENT
Start: 2022-03-04 | End: 2022-03-11

## 2022-03-04 RX ORDER — ONDANSETRON 4 MG/1
4 TABLET, ORALLY DISINTEGRATING ORAL EVERY 4 HOURS PRN
Qty: 10 TABLET | Refills: 0 | Status: SHIPPED | OUTPATIENT
Start: 2022-03-04 | End: 2022-04-07

## 2022-03-04 RX ORDER — CEFAZOLIN SODIUM 2 G/100ML
2 INJECTION, SOLUTION INTRAVENOUS ONCE
Status: DISCONTINUED | OUTPATIENT
Start: 2022-03-04 | End: 2022-03-04 | Stop reason: HOSPADM

## 2022-03-04 RX ORDER — BUPIVACAINE HYDROCHLORIDE 2.5 MG/ML
INJECTION, SOLUTION EPIDURAL; INFILTRATION; INTRACAUDAL AS NEEDED
Status: DISCONTINUED | OUTPATIENT
Start: 2022-03-04 | End: 2022-03-04 | Stop reason: HOSPADM

## 2022-03-04 RX ORDER — ROCURONIUM BROMIDE 10 MG/ML
INJECTION, SOLUTION INTRAVENOUS AS NEEDED
Status: DISCONTINUED | OUTPATIENT
Start: 2022-03-04 | End: 2022-03-04 | Stop reason: SURG

## 2022-03-04 RX ORDER — MIDAZOLAM HYDROCHLORIDE 1 MG/ML
2 INJECTION INTRAMUSCULAR; INTRAVENOUS ONCE
Status: COMPLETED | OUTPATIENT
Start: 2022-03-04 | End: 2022-03-04

## 2022-03-04 RX ORDER — CEFAZOLIN SODIUM 1 G/3ML
INJECTION, POWDER, FOR SOLUTION INTRAMUSCULAR; INTRAVENOUS AS NEEDED
Status: DISCONTINUED | OUTPATIENT
Start: 2022-03-04 | End: 2022-03-04 | Stop reason: SURG

## 2022-03-04 RX ORDER — ACETAMINOPHEN 500 MG
1000 TABLET ORAL ONCE
Status: COMPLETED | OUTPATIENT
Start: 2022-03-04 | End: 2022-03-04

## 2022-03-04 RX ORDER — PROMETHAZINE HYDROCHLORIDE 12.5 MG/1
25 TABLET ORAL ONCE AS NEEDED
Status: DISCONTINUED | OUTPATIENT
Start: 2022-03-04 | End: 2022-03-04 | Stop reason: HOSPADM

## 2022-03-04 RX ORDER — GLYCOPYRROLATE 0.2 MG/ML
INJECTION INTRAMUSCULAR; INTRAVENOUS AS NEEDED
Status: DISCONTINUED | OUTPATIENT
Start: 2022-03-04 | End: 2022-03-04 | Stop reason: SURG

## 2022-03-04 RX ORDER — ONDANSETRON 2 MG/ML
INJECTION INTRAMUSCULAR; INTRAVENOUS AS NEEDED
Status: DISCONTINUED | OUTPATIENT
Start: 2022-03-04 | End: 2022-03-04 | Stop reason: SURG

## 2022-03-04 RX ORDER — OXYCODONE HYDROCHLORIDE 5 MG/1
5 TABLET ORAL
Status: DISCONTINUED | OUTPATIENT
Start: 2022-03-04 | End: 2022-03-04 | Stop reason: HOSPADM

## 2022-03-04 RX ORDER — PROMETHAZINE HYDROCHLORIDE 25 MG/1
25 SUPPOSITORY RECTAL ONCE AS NEEDED
Status: DISCONTINUED | OUTPATIENT
Start: 2022-03-04 | End: 2022-03-04 | Stop reason: HOSPADM

## 2022-03-04 RX ORDER — PHENYLEPHRINE HCL IN 0.9% NACL 1 MG/10 ML
SYRINGE (ML) INTRAVENOUS AS NEEDED
Status: DISCONTINUED | OUTPATIENT
Start: 2022-03-04 | End: 2022-03-04 | Stop reason: SURG

## 2022-03-04 RX ORDER — PROPOFOL 10 MG/ML
VIAL (ML) INTRAVENOUS AS NEEDED
Status: DISCONTINUED | OUTPATIENT
Start: 2022-03-04 | End: 2022-03-04 | Stop reason: SURG

## 2022-03-04 RX ORDER — MEPERIDINE HYDROCHLORIDE 25 MG/ML
12.5 INJECTION INTRAMUSCULAR; INTRAVENOUS; SUBCUTANEOUS
Status: DISCONTINUED | OUTPATIENT
Start: 2022-03-04 | End: 2022-03-04 | Stop reason: HOSPADM

## 2022-03-04 RX ORDER — SODIUM CHLORIDE, SODIUM LACTATE, POTASSIUM CHLORIDE, CALCIUM CHLORIDE 600; 310; 30; 20 MG/100ML; MG/100ML; MG/100ML; MG/100ML
9 INJECTION, SOLUTION INTRAVENOUS CONTINUOUS PRN
Status: DISCONTINUED | OUTPATIENT
Start: 2022-03-04 | End: 2022-03-04 | Stop reason: HOSPADM

## 2022-03-04 RX ORDER — LIDOCAINE HYDROCHLORIDE 20 MG/ML
INJECTION, SOLUTION INFILTRATION; PERINEURAL AS NEEDED
Status: DISCONTINUED | OUTPATIENT
Start: 2022-03-04 | End: 2022-03-04 | Stop reason: SURG

## 2022-03-04 RX ORDER — ONDANSETRON 2 MG/ML
4 INJECTION INTRAMUSCULAR; INTRAVENOUS ONCE AS NEEDED
Status: DISCONTINUED | OUTPATIENT
Start: 2022-03-04 | End: 2022-03-04 | Stop reason: HOSPADM

## 2022-03-04 RX ADMIN — CEFAZOLIN SODIUM 2 G: 1 INJECTION, POWDER, FOR SOLUTION INTRAMUSCULAR; INTRAVENOUS at 07:29

## 2022-03-04 RX ADMIN — Medication 100 MCG: at 07:48

## 2022-03-04 RX ADMIN — ROCURONIUM BROMIDE 10 MG: 10 INJECTION INTRAVENOUS at 10:15

## 2022-03-04 RX ADMIN — LIDOCAINE HYDROCHLORIDE 100 MG: 20 INJECTION, SOLUTION INFILTRATION; PERINEURAL at 07:34

## 2022-03-04 RX ADMIN — Medication 100 MCG: at 08:03

## 2022-03-04 RX ADMIN — PROPOFOL 200 MG: 10 INJECTION, EMULSION INTRAVENOUS at 07:34

## 2022-03-04 RX ADMIN — LIDOCAINE HYDROCHLORIDE 50 MG: 20 INJECTION, SOLUTION INFILTRATION; PERINEURAL at 10:01

## 2022-03-04 RX ADMIN — ROCURONIUM BROMIDE 20 MG: 10 INJECTION INTRAVENOUS at 08:31

## 2022-03-04 RX ADMIN — ACETAMINOPHEN 1000 MG: 500 TABLET ORAL at 07:04

## 2022-03-04 RX ADMIN — MIDAZOLAM HYDROCHLORIDE 2 MG: 1 INJECTION, SOLUTION INTRAMUSCULAR; INTRAVENOUS at 07:17

## 2022-03-04 RX ADMIN — ROCURONIUM BROMIDE 10 MG: 10 INJECTION INTRAVENOUS at 09:37

## 2022-03-04 RX ADMIN — SODIUM CHLORIDE, POTASSIUM CHLORIDE, SODIUM LACTATE AND CALCIUM CHLORIDE 9 ML/HR: 600; 310; 30; 20 INJECTION, SOLUTION INTRAVENOUS at 07:17

## 2022-03-04 RX ADMIN — HYDROMORPHONE HYDROCHLORIDE 1 MG: 2 INJECTION, SOLUTION INTRAMUSCULAR; INTRAVENOUS; SUBCUTANEOUS at 07:39

## 2022-03-04 RX ADMIN — GLYCOPYRROLATE 0.2 MG: 0.2 INJECTION INTRAMUSCULAR; INTRAVENOUS at 08:00

## 2022-03-04 RX ADMIN — SODIUM CHLORIDE, POTASSIUM CHLORIDE, SODIUM LACTATE AND CALCIUM CHLORIDE: 600; 310; 30; 20 INJECTION, SOLUTION INTRAVENOUS at 08:30

## 2022-03-04 RX ADMIN — GLYCOPYRROLATE 0.2 MG: 0.2 INJECTION INTRAMUSCULAR; INTRAVENOUS at 07:35

## 2022-03-04 RX ADMIN — DEXAMETHASONE SODIUM PHOSPHATE 8 MG: 4 INJECTION INTRA-ARTICULAR; INTRALESIONAL; INTRAMUSCULAR; INTRAVENOUS; SOFT TISSUE at 08:20

## 2022-03-04 RX ADMIN — HYDROMORPHONE HYDROCHLORIDE 0.2 MG: 2 INJECTION, SOLUTION INTRAMUSCULAR; INTRAVENOUS; SUBCUTANEOUS at 10:42

## 2022-03-04 RX ADMIN — ONDANSETRON 4 MG: 2 INJECTION INTRAMUSCULAR; INTRAVENOUS at 10:06

## 2022-03-04 RX ADMIN — SUGAMMADEX 320 MG: 100 INJECTION, SOLUTION INTRAVENOUS at 10:55

## 2022-03-04 RX ADMIN — LIDOCAINE HYDROCHLORIDE 50 MG: 20 INJECTION, SOLUTION INFILTRATION; PERINEURAL at 08:28

## 2022-03-04 RX ADMIN — LIDOCAINE HYDROCHLORIDE 50 MG: 20 INJECTION, SOLUTION INFILTRATION; PERINEURAL at 08:00

## 2022-03-04 RX ADMIN — ROCURONIUM BROMIDE 50 MG: 10 INJECTION INTRAVENOUS at 07:34

## 2022-03-04 RX ADMIN — Medication 200 MCG: at 07:59

## 2022-03-04 NOTE — H&P
Chief Complaint:  No chief complaint on file.    Primary Care Provider: Amaya Robbins DO    Referring Provider: Brad Cline MD    History of Present Illness  Vijay Mathew is a 62 y.o. male referred by Brad Cline MD for an inguinal hernia.  The patient has a very large left inguinal hernia that has been present for at least 30 years.  Patient does not have a lot of pain where the bulge is located.  The bulge used to be reducible but for the past 3 to 4 months the patient has been and able to get the bulge to reduce.  No obstructive symptoms.  The bulge is so large it makes it difficult for the patient to urinate.  He has to wear very loose pants because the bulge is so large.  No prior abdominal surgeries.  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  No known heart problems besides high blood pressure.  No heart attack or stroke.    Allergies: Patient has no known allergies.    Outpatient Medications Marked as Taking for the 3/4/22 encounter (Hospital Encounter)   Medication Sig Dispense Refill   • APPLE CIDER VINEGAR PO Take 1 teaspoon(s) by mouth Daily.         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    Family History: History reviewed. No pertinent family history.     Social History:  Social History     Tobacco Use   • Smoking status: Current Every Day Smoker     Packs/day: 1.00     Years: 45.00     Pack years: 45.00     Start date: 1977   • Smokeless tobacco: Never Used   • Tobacco comment: 1600 3/3/22   Substance Use Topics   • Alcohol use: Not Currently  "      Objective     Vital Signs:  /64 (BP Location: Left arm, Patient Position: Lying)   Pulse 68   Temp 98 °F (36.7 °C) (Temporal)   Resp 18   Ht 170.2 cm (67\")   Wt 79.8 kg (175 lb 14.8 oz)   SpO2 98%   BMI 27.55 kg/m²   • Respiratory:  breathing not labored, respiratory effort appears normal  • Cardiovascular:  heart regular rate  • Musculoskeletal: moving all extremities symmetrically and purposefully  • Neurologic:  no obvious motor or sensory deficits, alert & oriented x 3, speech clear  • Psychiatric:  judgment and insight intact, mood normal, affect appropriate, cooperative    Assessment:  Left inguinoscrotal hernia    Plan:  Robotic left inguinal hernia repair.    Discussion: Indications, options, risk, benefits, and expected outcomes of planned surgery were discussed with the patient and he agrees to proceed.  Patient fully understands that I may need to remove his left testicle given the extremely large size of the hernia.  He also understands there is a possibility the surgery may need to be converted to an open procedure.    Brad Cline MD  03/04/2022    Electronically signed by Brad Cline MD, 03/04/22, 7:06 AM EST.        "

## 2022-03-04 NOTE — ANESTHESIA POSTPROCEDURE EVALUATION
Patient: Vijay Mathew    Procedure Summary     Date: 03/04/22 Room / Location: [facility] OR 08 / [facility] MAIN OR    Anesthesia Start: 0728 Anesthesia Stop: 1110    Procedure: ROBOTIC LEFT INGUINAL HERNIA REPAIR WITH MESH PLACEMENT (Left Abdomen) Diagnosis:       Left inguinal hernia      (Left inguinal hernia [K40.90])    Surgeons: Brad Cline MD Provider: Jose Chua MD    Anesthesia Type: general ASA Status: 3          Anesthesia Type: general    Vitals  Vitals Value Taken Time   /81 03/04/22 1151   Temp 36.5 °C (97.7 °F) 03/04/22 1110   Pulse 96 03/04/22 1152   Resp 14 03/04/22 1130   SpO2 96 % 03/04/22 1152   Vitals shown include unvalidated device data.        Post Anesthesia Care and Evaluation    Patient location during evaluation: bedside  Patient participation: complete - patient participated  Level of consciousness: awake and alert  Pain management: adequate  Airway patency: patent  Anesthetic complications: No anesthetic complications  PONV Status: none  Cardiovascular status: acceptable  Respiratory status: acceptable  Hydration status: acceptable    Comments: An Anesthesiologist personally participated in the most demanding procedures (including induction and emergence if applicable) in the anesthesia plan, monitored the course of anesthesia administration at frequent intervals and remained physically present and available for immediate diagnosis and treatment of emergencies.

## 2022-03-04 NOTE — ANESTHESIA POSTPROCEDURE EVALUATION
Patient: Vijay Mathew    Procedure Summary     Date: 03/04/22 Room / Location: [facility] OR 08 / [facility] MAIN OR    Anesthesia Start: 0728 Anesthesia Stop: 1110    Procedure: ROBOTIC LEFT INGUINAL HERNIA REPAIR WITH MESH PLACEMENT (Left Abdomen) Diagnosis:       Left inguinal hernia      (Left inguinal hernia [K40.90])    Surgeons: Brad Cline MD Provider: Jose Chua MD    Anesthesia Type: general ASA Status: 3          Anesthesia Type: general    Vitals  Vitals Value Taken Time   /87 03/04/22 1126   Temp     Pulse 90 03/04/22 1129   Resp     SpO2 90 % 03/04/22 1129   Vitals shown include unvalidated device data.        Post Anesthesia Care and Evaluation    Patient location during evaluation: bedside  Patient participation: complete - patient participated  Level of consciousness: awake  Pain management: adequate  Airway patency: patent  Anesthetic complications: No anesthetic complications  PONV Status: none  Cardiovascular status: acceptable and stable  Respiratory status: acceptable  Hydration status: acceptable    Comments: An Anesthesiologist personally participated in the most demanding procedures (including induction and emergence if applicable) in the anesthesia plan, monitored the course of anesthesia administration at frequent intervals and remained physically present and available for immediate diagnosis and treatment of emergencies.

## 2022-03-04 NOTE — DISCHARGE INSTRUCTIONS
Dr. Cline's Instructions          DIET  Gradually increase your dietary intake.  Although you will likely feel very hungry after surgery, do not eat too much for the first 12 to 24 hours.  Begin with a bland diet, such as chicken noodle soup, crackers, gatorade or tea, and gradually work your way up to a normal diet.     ACTIVITY & RETURN TO WORK  When you first get home from the hospital, it is important that you get up and move around your house.  For the next six weeks, you should avoid any strenuous physical activity and you should not lift anything heavier than 25 pounds.  Walking up stairs and walking short distances for exercise are acceptable activities.  After six weeks, you have no activity restrictions and may gradually increase your activities using common sense.  You are excused from work for six weeks but you may return to work anytime after three weeks should you feel able to do so and you abide by the lifting restriction.     WOUND CARE & SHOWERING/BATHING  Remove the bandages two days after your surgery but leave the strips of tape (steri-strips) underneath the bandages in place.  Let the steri-strips fall off by themselves or gently pull them off if they haven't fallen off by themselves in 10 days.  You have sutures in your incisions but they are placed below the level of the skin and they will slowly dissolve (they do not need to be removed).  The skin around your incisions will likely have some bruising.  This is normal.  Your scrotum will likely swell some and may even become bruised.  This is normal too.  You can shower beginning two days after the surgery but try not to get the steri-strips very wet for the first week after surgery.  Wait two weeks after your surgery before taking any tub baths.      PAIN CONTROL  You will receive a narcotic pain medicine prescription and an anti-nausea medicine prescription before you are discharged home.  Be sure to take the narcotic pain medication with  some food so as not to upset your stomach.  Do not drive while you are taking the prescription pain medication.  Also, take 3 tablets of Motrin 200 mg (total of 600mg) every 8 hours for the next 3 days & then discontinue.  Advil and ibuprofen work the same as Motrin so you can use the same dose of either one of them instead of Motrin.  Some patients find that using an ice pack (a package of frozen corn or peas works well) for the first two days after surgery helps reduce pain.  If you decide to use an ice pack, apply it for 20 minutes and then remove it for 20 minutes.  Do this 4 or 5 times each day for only the first two or three days after surgery.  You will likely have some shoulder pain (especially the right shoulder).  This is caused by the air used to inflate your abdomen during surgery and will go away on its own in 24 to 48 hours.    HOME MEDICINES  Resume all of your normal home medicines.  Additional, you will receive an antibiotic name Cephalexin to take for 7 days.  Take it as prescribed.    DRAIN  Care for the drain as you are instructed by the nursing staff at the hospital.  Additionally, beginning tomorrow, clean the area where the drain exits the skin of your scrotum with alcohol daily.  Fluid will likely leak out around the drain.  That is not unexpected.     BOWEL MOVEMENTS  It is not unusual for narcotic pain medications to cause constipation.  Also, the medications and anesthesia you received for your surgery can have a constipating effect.  To help avoid constipation, drink at least four eight-ounce glasses of water each day and use over-the-counter laxatives/stool softeners (dulcolax, milk of magnesia, senokot, etc.).  I recommend drinking the aforementioned amount of water daily and taking 30 ml of milk of magnesia two times each day while you are using the prescribed narcotic pain medication.  If your bowel movements become too loose or too frequent, then simply stop following these  recommendations unless you start to feel constipated.     FOLLOW-UP VISIT & QUESTIONS/CONCERNS  Call Dr. Cline's office at [phone] and schedule a follow-up on March 8 .  Should you have any questions or concerns, have a temperature over 101 degrees, worsening abdominal pain, persistent nausea or vomiting, or any other problems you think need medical attention, please call Dr. Cline's office or go to the emergency room.                 DISCHARGE INSTRUCTIONS  HERNIA Vijay Mathew     ? For your surgery you had:  ? General anesthesia (you may have a sore throat for the first 24 hours)  ? IV sedation.  ? Local anesthesia  ? Monitored anesthesia care  ? You may experience dizziness, drowsiness, or light-headedness for several hours following surgery/procedure.  ? Do not stay alone today or tonight.  ? Limit your activity for 24 hours.  ? Resume your diet slowly.  Follow whatever special dietary instructions you may have been given by your doctor.  ? You should not drive, operate machinery, drink alcohol, or sign legally binding documents for 24 hours or while you are taking pain medication.    NOTIFY YOUR DOCTOR IF YOU EXPERIENCE ANY OF THE FOLLOWING:  ? Temperature greater than 101 degrees Fahrenheit  ? Shaking Chills  ? Redness or excessive drainage from incision  ? Nausea, vomiting and/or pain that is not controlled by prescribed medications  ? Increase in bleeding or bleeding that is excessive  ? Unable to urinate in 6 hours after surgery  ? If unable to reach your doctor, please go to the closest Emergency Room   [x] You may shower  ? Apply an ice pack for 24-48 hours.  ? Do not do any heavy lifting, pushing or pulling anything heavier than 10 pounds until follow up  ? You may walk up and down stairs.  ? You may ride in a car but do not drive until instructed by your physician.  ? Avoid constipation.  ? If unable to urinate in 6 to 8 hours after surgery or urinating frequently in small amounts, notify your  doctor or go to the nearest Emergency Room.  ? Medications per physician instructions as indicated on Discharge Medication Information Sheet.

## 2022-03-04 NOTE — ANESTHESIA PREPROCEDURE EVALUATION
Anesthesia Evaluation     Patient summary reviewed and Nursing notes reviewed   no history of anesthetic complications:  NPO Solid Status: > 8 hours  NPO Liquid Status: > 2 hours           Airway   Mallampati: II  TM distance: >3 FB  Neck ROM: full  No difficulty expected  Dental    (+) edentulous    Pulmonary - normal exam    breath sounds clear to auscultation  (+) COPD mild, shortness of breath,   Cardiovascular - normal exam  Exercise tolerance: good (4-7 METS)    Rhythm: regular  Rate: normal    (+) hypertension, hyperlipidemia,       Neuro/Psych- negative ROS  GI/Hepatic/Renal/Endo - negative ROS     Musculoskeletal (-) negative ROS    Abdominal    Substance History - negative use     OB/GYN negative ob/gyn ROS         Other - negative ROS                       Anesthesia Plan    ASA 3     general   (Patient understands anesthesia not responsible for dental damage.)  intravenous induction     Anesthetic plan, all risks, benefits, and alternatives have been provided, discussed and informed consent has been obtained with: patient.    Plan discussed with CRNA.        CODE STATUS:

## 2022-03-04 NOTE — OP NOTE
INGUINAL HERNIA REPAIR LAPAROSCOPIC WITH DAVINCI ROBOT  Procedure Report    Patient Name:  Vijay Mathew  YOB: 1960    Date of Surgery:  3/4/2022     Pre-op Diagnosis:   Left inguinal hernia (inginoscrotal hernia)  - not reducible       Post-op Diagnosis:   Left inguinal hernia (inginoscrotal hernia)  - not reducible    Procedure/CPT® Codes:  67369-17    Procedure(s):  ROBOTIC LEFT INGUINAL HERNIA REPAIR WITH MESH PLACEMENT    Staff:  Surgeon(s):  Brad Cline MD    Assistant: Horacio Yuan CSA    Anesthesia: General    Estimated Blood Loss: 20 mL    Complications:  None    Drains:  15 English placed in the scrotum    Packing:  None    Implants:    Implant Name Type Inv. Item Serial No.  Lot No. LRB No. Used Action   DEV CLS WND VLOC/180 ISIDRO ABS 1/2CIR SZ3/0 17MM 15CM GRN - [id number] Implant DEV CLS WND VLOC/180 ISIDRO ABS 1/2CIR SZ3/0 17MM 15CM GRN  COVID[employer] [id number] Left 2 Implanted   MESH LAP PROGRIP SLF/FIX FLT/SHET [id number] BX/1 - [id number] Implant MESH LAP PROGRIP SLF/FIX FLT/SHET [id number] BX/1  [employer] [id number] Left 1 Implanted   MESH PROGRIP LAP S/FIX ABS [id number] BX/2 - [id number] Implant MESH PROGRIP LAP S/FIX ABS [id number] BX/2  C[employer] [id number] Left 1 Implanted       Specimen:  None     Indications: 62-year-old male with a large inguinal scrotal hernia.  CT scan was done and almost all of the patient's small bowel is located in the scrotum as well as a portion of the colon.  Hernia is not reducible.     Findings:   Approximately one half of the small bowel was needed into the scrotum.  A portion of the sigmoid colon was herniated into the scrotum as well.  Herniated small bowel and colon was not obstructed.  A large amount of omentum was herniated into the scrotum as well.  Hernia defect large left indirect inguinal hernia.  Hernia was repaired with a 16 cm wide by 12 cm high ProGrip mesh overlapped with a 15 cm wide by 10 cm high ProGrip mesh.  The second 15 x 10  cm mesh was needed to provide adequate coverage laterally.  Modifier 22 should be applied to the procedure.    Description of Procedure: Patient was taken to the operating placed supine on the operative table.  Timeout was performed.  General anesthesia was administered.  The patient was prepped and draped in the usual fashion.  Using my standard technique with a Veress needle to establish pneumoperitoneum and 4 8 mm robotic cannulas were placed transversely across the abdominal wall several centimeters above the umbilicus.  The patient will positioned head down.  The robotic arms were docked.  The robotic instruments were inserted.  The inguinal areas were inspected.  There was no hernia on the right side.  On the left side there was a large indirect inguinal hernia with a large amount of herniated tissue.  I used 2 bipolar graspers to incrementally reduce the herniated tissue.  Herniated tissue included a large amount of omentum, large amount of small bowel, and a portion of the sigmoid colon.  I was eventually able to reduce all of the tissue without any injury to the tissue but this was a very difficult process and required about 1.5 hours of time.  After that all of the herniated tissue was reduced, a cut was made in the peritoneum beginning at the left medial umbilical ligament and extending to the left anterior superior iliac spine with a cut about 4 cm superior to the top of the hernia defect.  A peritoneal flap was then developed inferiorly.  First medially until the flap was developed several centimeters inferior to Jaylen's ligament.  Flap was then developed laterally until several centimeters inferior to the iliopubic tract.  Attention was focused at the inguinal ring and I dissected the hernia sac as much as possible distally and then divided the hernia sac distally using a vessel sealer.  The hernia sac was divided at approximately the mid scrotum area.  The sac was very thick given the chronic  herniation of tissue.  Eventually I was able to reduce the sac off of the cord structures satisfactorily and then further developed the peritoneal flap until there was satisfactory space for placement of mesh.  A 15 French drain was placed in the abdominal cavity exteriorized through the scrotum and secured to the scrotal skin with a nylon suture.  A 16 cm wide by 12 cm high ProGrip mesh was placed with the medial aspect just to the right side of the pubic symphysis and the inferior aspect placed about 3 cm inferior to Jaylen's ligament.  The mesh was positioned appropriately against the abdominal wall but did not provide adequate coverage beyond the hernia defect laterally so I placed a 15 cm wide by 10 similar high ProGrip mesh at the lateral left inguinal space overlapping with the 16 x 12 cm ProGrip mesh so that adequate mesh coverage beyond the defect laterally would be provided.  The mesh was further secured with an interrupted 3-0 Vicryl suture at Jaylen's ligament and two or three other areas of the abdominal wall.  The peritoneal flap was then closed over the mesh with running absorbable V-Loc suture.  Sponge needle and instrument counts were verified as correct.  The robotic arms were undocked and the robotic cannulas were removed.  The skin incisions were closed appropriately with buried absorbable suture followed by appropriate dressings.  Patient did well during the procedure and was transported to the recovery area in stable condition.    Assistant: Horacio Yuan CSA  was responsible for performing the following activities: closing, placing dressing and assisting with docking robot and exchanging robotic instruments and adjusting robotic arms as needed during the procedure, and his skilled assistance was necessary for the success of this case.    Brad Cline MD     Date: 3/4/2022  Time: 11:47 EST

## 2022-03-08 ENCOUNTER — OFFICE VISIT (OUTPATIENT)
Dept: SURGERY | Facility: CLINIC | Age: 62
End: 2022-03-08

## 2022-03-08 VITALS — BODY MASS INDEX: 27.4 KG/M2 | RESPIRATION RATE: 16 BRPM | WEIGHT: 174.6 LBS | HEIGHT: 67 IN

## 2022-03-08 DIAGNOSIS — Z09 FOLLOW UP: Primary | ICD-10-CM

## 2022-03-08 PROCEDURE — 99024 POSTOP FOLLOW-UP VISIT: CPT | Performed by: SURGERY

## 2022-03-08 NOTE — PROGRESS NOTES
Patient is here for follow-up after I repaired a very large left inguinal hernia last week.  I placed a drain in the scrotum and told the patient to follow-up with me today.  Drain output is serosanguineous and the volume over the past 24 hours has been about 40 to 50 mL.  Thus far the incisions are healing very well and there is no evidence of recurrent hernia.  I will keep the drain in place and have the patient see me this Friday on which day I will remove the drain.

## 2022-03-11 ENCOUNTER — OFFICE VISIT (OUTPATIENT)
Dept: SURGERY | Facility: CLINIC | Age: 62
End: 2022-03-11

## 2022-03-11 VITALS — BODY MASS INDEX: 27.31 KG/M2 | HEIGHT: 67 IN | WEIGHT: 174 LBS | RESPIRATION RATE: 16 BRPM

## 2022-03-11 DIAGNOSIS — Z09 FOLLOW UP: Primary | ICD-10-CM

## 2022-03-11 PROCEDURE — 99024 POSTOP FOLLOW-UP VISIT: CPT | Performed by: SURGERY

## 2022-03-11 NOTE — PROGRESS NOTES
Patient is here for another follow-up appointment after I repaired a very large left inguinal hernia.  He continues to do well.  I remove the drain today.  Everything looks fine so far.  I will have the patient see me again in 2 weeks.

## 2022-03-28 ENCOUNTER — OFFICE VISIT (OUTPATIENT)
Dept: SURGERY | Facility: CLINIC | Age: 62
End: 2022-03-28

## 2022-03-28 VITALS — HEIGHT: 67 IN | BODY MASS INDEX: 27.94 KG/M2 | RESPIRATION RATE: 16 BRPM | WEIGHT: 178 LBS

## 2022-03-28 DIAGNOSIS — Z09 FOLLOW UP: Primary | ICD-10-CM

## 2022-03-28 PROCEDURE — 99024 POSTOP FOLLOW-UP VISIT: CPT | Performed by: SURGERY

## 2022-03-28 NOTE — PROGRESS NOTES
Patient is here for another follow-up after repair of a huge left inguinal hernia.  He continues to do very well.  He only occasionally has mild pain in his left inguinal area but none more than expected.  He says he thinks his left scrotum has swollen a little bit since the last time I saw him.  On exam, the patient's left scrotum is somewhat larger than was last time I saw the patient and it would not be unexpected for him to develop a seroma.  I told the patient that we will just keep an eye on things for now and I will have him see me again in 1 month and if there is still swelling then I will drain fluid from the scrotum while he is in the office that day.

## 2022-04-07 ENCOUNTER — OFFICE VISIT (OUTPATIENT)
Dept: FAMILY MEDICINE CLINIC | Facility: CLINIC | Age: 62
End: 2022-04-07

## 2022-04-07 VITALS
OXYGEN SATURATION: 96 % | WEIGHT: 176.3 LBS | BODY MASS INDEX: 27.67 KG/M2 | HEART RATE: 80 BPM | TEMPERATURE: 98.9 F | SYSTOLIC BLOOD PRESSURE: 149 MMHG | DIASTOLIC BLOOD PRESSURE: 77 MMHG | RESPIRATION RATE: 20 BRPM | HEIGHT: 67 IN

## 2022-04-07 DIAGNOSIS — E78.2 MIXED HYPERLIPIDEMIA: ICD-10-CM

## 2022-04-07 DIAGNOSIS — I10 PRIMARY HYPERTENSION: Primary | Chronic | ICD-10-CM

## 2022-04-07 DIAGNOSIS — Z12.2 ENCOUNTER FOR SCREENING FOR LUNG CANCER: ICD-10-CM

## 2022-04-07 DIAGNOSIS — F17.219 CIGARETTE NICOTINE DEPENDENCE WITH NICOTINE-INDUCED DISORDER: Chronic | ICD-10-CM

## 2022-04-07 PROBLEM — K40.90 NON-RECURRENT UNILATERAL INGUINAL HERNIA WITHOUT OBSTRUCTION OR GANGRENE: Chronic | Status: RESOLVED | Noted: 2021-11-10 | Resolved: 2022-04-07

## 2022-04-07 PROCEDURE — 99214 OFFICE O/P EST MOD 30 MIN: CPT | Performed by: FAMILY MEDICINE

## 2022-04-07 RX ORDER — LOSARTAN POTASSIUM 100 MG/1
50 TABLET ORAL DAILY
Qty: 45 TABLET | Refills: 1 | Status: SHIPPED | OUTPATIENT
Start: 2022-04-07 | End: 2022-10-06 | Stop reason: SDUPTHER

## 2022-04-07 NOTE — PROGRESS NOTES
"Chief Complaint  Hypertension    Subjective          Vijay Mathew presents to [facility]  He is here today for a follow-up for his chronic medical conditions. He has COPD, HTN and tobacco abuse. He does not have a past family history of cancer. He has smoked for over 40 years.      His home blood pressure is running 135/85.      He is trying to cut back smoking.      The patient has no other complaints today and denies chest pain, shortness of breath, weakness, numbness, nausea, vomiting, diarrhea, dizziness or syncopal event.      Objective   Vital Signs:   /77 (BP Location: Left arm, Patient Position: Sitting) Comment: his machine was 160/100  Pulse 80   Temp 98.9 °F (37.2 °C)   Resp 20   Ht 170.2 cm (67.01\")   Wt 80 kg (176 lb 4.8 oz)   SpO2 96%   BMI 27.61 kg/m²            Physical Exam  Vitals reviewed.   Constitutional:       Appearance: Normal appearance. He is well-developed.   HENT:      Head: Normocephalic and atraumatic.      Right Ear: External ear normal.      Left Ear: External ear normal.      Mouth/Throat:      Pharynx: No oropharyngeal exudate.   Eyes:      Conjunctiva/sclera: Conjunctivae normal.      Pupils: Pupils are equal, round, and reactive to light.   Neck:      Vascular: No carotid bruit.   Cardiovascular:      Rate and Rhythm: Normal rate and regular rhythm.      Heart sounds: No murmur heard.    No friction rub. No gallop.   Pulmonary:      Effort: Pulmonary effort is normal.      Breath sounds: Normal breath sounds. No wheezing or rhonchi.   Abdominal:      General: Bowel sounds are normal. There is no distension.      Palpations: Abdomen is soft.      Tenderness: There is no abdominal tenderness.   Skin:     General: Skin is warm and dry.   Neurological:      Mental Status: He is alert and oriented to person, place, and time.      Cranial Nerves: No cranial nerve deficit.      Motor: No weakness.   Psychiatric:         Mood and Affect: " Mood and affect normal.         Behavior: Behavior normal.         Thought Content: Thought content normal.         Judgment: Judgment normal.        Result Review :     CMP    CMP 10/28/21 2/22/22   Glucose 106 (A)    BUN 19    Creatinine 0.98 1.00   eGFR Non African Am 78    Sodium 140    Potassium 4.1    Chloride 105    Calcium 9.3    Albumin 4.50    Total Bilirubin 0.3    Alkaline Phosphatase 84    AST (SGOT) 21    ALT (SGPT) 18    (A) Abnormal value       Comments are available for some flowsheets but are not being displayed.           CBC    CBC 10/28/21   WBC 8.00   RBC 5.32   Hemoglobin 15.8   Hematocrit 46.8   MCV 88.0   MCH 29.7   MCHC 33.8   RDW 14.6   Platelets 171           Lipid Panel    Lipid Panel 10/28/21   Total Cholesterol 225 (A)   Triglycerides 262 (A)   HDL Cholesterol 30 (A)   VLDL Cholesterol 48 (A)   LDL Cholesterol  147 (A)   LDL/HDL Ratio 4.75   (A) Abnormal value            TSH    TSH 10/28/21   TSH 1.850                     Assessment and Plan    Diagnoses and all orders for this visit:    1. Primary hypertension (Primary)  Assessment & Plan:  Hypertension is improving with treatment.  Continue current treatment regimen.  Dietary sodium restriction.  Weight loss.  Blood pressure will be reassessed at the next regular appointment.      2. Encounter for screening for lung cancer  -      CT Chest Low Dose Cancer Screening WO; Future    3. Mixed hyperlipidemia  Assessment & Plan:  Lipid abnormalities are improving with treatment.  Nutritional counseling was provided. and Pharmacotherapy as ordered.  Lipids will be reassessed in 3 months.    Orders:  -     Lipid panel; Future  -     Comprehensive metabolic panel; Future    4. Cigarette nicotine dependence with nicotine-induced disorder  Assessment & Plan:  Tobacco use is unchanged.  Smoking cessation counseling was provided.  Tobacco use will be reassessed at the next regular appointment.      Other orders  -     losartan (Cozaar) 100 MG  tablet; Take 0.5 tablets by mouth Daily.  Dispense: 45 tablet; Refill: 1      Follow Up   Return in about 3 months (around 7/7/2022).  Patient was given instructions and counseling regarding his condition or for health maintenance advice. Please see specific information pulled into the AVS if appropriate.

## 2022-04-11 ENCOUNTER — LAB (OUTPATIENT)
Dept: LAB | Facility: HOSPITAL | Age: 62
End: 2022-04-11

## 2022-04-11 DIAGNOSIS — E78.2 MIXED HYPERLIPIDEMIA: ICD-10-CM

## 2022-04-11 LAB
ALBUMIN SERPL-MCNC: 4.6 G/DL (ref 3.5–5.2)
ALBUMIN/GLOB SERPL: 1.8 G/DL
ALP SERPL-CCNC: 95 U/L (ref 39–117)
ALT SERPL W P-5'-P-CCNC: 17 U/L (ref 1–41)
ANION GAP SERPL CALCULATED.3IONS-SCNC: 10.8 MMOL/L (ref 5–15)
AST SERPL-CCNC: 21 U/L (ref 1–40)
BILIRUB SERPL-MCNC: 0.4 MG/DL (ref 0–1.2)
BUN SERPL-MCNC: 19 MG/DL (ref 8–23)
BUN/CREAT SERPL: 22.1 (ref 7–25)
CALCIUM SPEC-SCNC: 9.4 MG/DL (ref 8.6–10.5)
CHLORIDE SERPL-SCNC: 105 MMOL/L (ref 98–107)
CHOLEST SERPL-MCNC: 152 MG/DL (ref 0–200)
CO2 SERPL-SCNC: 26.2 MMOL/L (ref 22–29)
CREAT SERPL-MCNC: 0.86 MG/DL (ref 0.76–1.27)
EGFRCR SERPLBLD CKD-EPI 2021: 97.3 ML/MIN/1.73
GLOBULIN UR ELPH-MCNC: 2.6 GM/DL
GLUCOSE SERPL-MCNC: 111 MG/DL (ref 65–99)
HDLC SERPL-MCNC: 30 MG/DL (ref 40–60)
LDLC SERPL CALC-MCNC: 85 MG/DL (ref 0–100)
LDLC/HDLC SERPL: 2.63 {RATIO}
POTASSIUM SERPL-SCNC: 3.9 MMOL/L (ref 3.5–5.2)
PROT SERPL-MCNC: 7.2 G/DL (ref 6–8.5)
SODIUM SERPL-SCNC: 142 MMOL/L (ref 136–145)
TRIGL SERPL-MCNC: 215 MG/DL (ref 0–150)
VLDLC SERPL-MCNC: 37 MG/DL (ref 5–40)

## 2022-04-11 PROCEDURE — 36415 COLL VENOUS BLD VENIPUNCTURE: CPT

## 2022-04-11 PROCEDURE — 80061 LIPID PANEL: CPT

## 2022-04-11 PROCEDURE — 80053 COMPREHEN METABOLIC PANEL: CPT

## 2022-04-25 ENCOUNTER — OFFICE VISIT (OUTPATIENT)
Dept: SURGERY | Facility: CLINIC | Age: 62
End: 2022-04-25

## 2022-04-25 ENCOUNTER — PREP FOR SURGERY (OUTPATIENT)
Dept: OTHER | Facility: HOSPITAL | Age: 62
End: 2022-04-25

## 2022-04-25 ENCOUNTER — APPOINTMENT (OUTPATIENT)
Dept: CT IMAGING | Facility: HOSPITAL | Age: 62
End: 2022-04-25

## 2022-04-25 VITALS — RESPIRATION RATE: 16 BRPM | HEIGHT: 67 IN | WEIGHT: 173 LBS | BODY MASS INDEX: 27.15 KG/M2

## 2022-04-25 DIAGNOSIS — R22.0 MASS OF POSTAURICULAR AREA: Primary | ICD-10-CM

## 2022-04-25 PROCEDURE — 99213 OFFICE O/P EST LOW 20 MIN: CPT | Performed by: SURGERY

## 2022-04-25 RX ORDER — CEFAZOLIN SODIUM 2 G/100ML
2 INJECTION, SOLUTION INTRAVENOUS ONCE
Status: CANCELLED | OUTPATIENT
Start: 2022-04-25 | End: 2022-04-25

## 2022-04-25 NOTE — PROGRESS NOTES
Chief Complaint:  Follow-up    Primary Care Provider: Amaya Robbins DO    Referring Provider: Amaya Robbins DO    History of Present Illness  Vijay Mathew is a 62 y.o. male referred by Amaya Robbins DO for a left posterior ear mass.  Vijay is known to me as I repaired a very large left inguinal hernia several months ago.  Regarding the hernia, he is doing very well.  He had some swelling at his left scrotum the last time I saw him but none there was significant it was bothering very much and he says he still has a little bit of swelling but not anything that is problematic for him and he prefers to defer any attempt the aspirating a presumed seroma.  The specific reason for the appointment today is that the patient has a mass behind his left ear that has been present for about 20 years and is gradually increased to its current size.  She cannot wear a hearing aid on his left ear because of the mass and it is difficult for his glasses to stay on because of the mass.  No pain where the mass is located.     Allergies: Patient has no known allergies.    Outpatient Medications Marked as Taking for the 4/25/22 encounter (Office Visit) with Brad Cline MD   Medication Sig Dispense Refill   • APPLE CIDER VINEGAR PO Take 1 teaspoon(s) by mouth Daily.     • Ascorbic Acid (VITAMIN C PO) Take  by mouth.     • atorvastatin (LIPITOR) 10 MG tablet Take 1 tablet by mouth Daily. 30 tablet 2   • losartan (Cozaar) 100 MG tablet Take 0.5 tablets by mouth Daily. 45 tablet 1   • Multiple Vitamins-Minerals (EMERGEN-C IMMUNE PLUS PO) Take  by mouth.     • VITAMIN D PO Take  by mouth.     • ZINC CITRATE PO Take  by mouth.         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, but [place]- hearing aid right ear    • History of kidney stones   • History of rheumatic fever   • Hyperlipidemia   • Hypertension   • Inguinal hernia    VERY LARGE HERNIA- LEFT GROIN   • Shortness of breath     "none currently    • Sinus trouble        Past Surgical History:   • INGUINAL HERNIA REPAIR    Procedure: ROBOTIC LEFT INGUINAL HERNIA REPAIR WITH MESH PLACEMENT;  Surgeon: Brad Cline MD;  Location: [facility];  Service: Robotics - [facility];  Laterality: Left;       Family History:   Family History   Family history unknown: Yes        Social History:  Social History     Tobacco Use   • Smoking status: Current Every Day Smoker     Packs/day: 1.00     Years: 45.00     Pack years: 45.00     Types: Cigarettes     Start date: 1977   • Smokeless tobacco: Never Used   • Tobacco comment: 1600 3/3/22   Substance Use Topics   • Alcohol use: Not Currently       Objective     Vital Signs:  Resp 16   Ht 170.2 cm (67\")   Wt 78.5 kg (173 lb)   BMI 27.10 kg/m²   • Constitutional: healthy appearing, alert, no acute distress, reliable historian  • HENT:  NCAT, at the left postauricular area, there is an oval shaped fairly well circumscribed subcutaneous mass about 10 cm long x 8 cm wide x 4 cm deep that is soft, nontender, and mobile.  The mass is not attached to the ear.  • Eyes:  sclerae clear, conjunctivae clear, EOMI  • Neck:  normal appearance, no masses, trachea midline  • Respiratory:  breathing not labored, respiratory effort appears normal  • Cardiovascular:  heart regular rate  • Abdomen:  soft, nontender, nondistended    • Skin and subcutaneous tissue:  Warm and dry.  See above HENT section  • Musculoskeletal: moving all extremities symmetrically and purposefully  • Neurologic:  no obvious motor or sensory deficits, normal gait, able to stand without difficulty, cerebellar function without any obvious abnormalities, alert & oriented x 3, speech clear  • Psychiatric:  judgment and insight intact, mood normal, affect appropriate, cooperative    Assessment:  Mass of left postauricular area    Plan:  Excision of left postauricular mass    Discussion: Indications, options, risks, benefits, and expected outcomes of " planned surgery were discussed with the patient and he agrees to proceed.    Brad Cline MD  04/25/2022    Electronically signed by Brad Cline MD, 04/25/22, 4:07 PM EDT.

## 2022-05-06 NOTE — PRE-PROCEDURE INSTRUCTIONS
PRE-OP INSTRUCTIONS REVIEWED WITH PATIENT: FASTING/BATHING/ARRIVAL PROCEDURES.  INSTRUCTED TO TAKE A.M. DAY OF SURGERY: ATORVASTATIN  UNDERSTANDING VERBALIZED.

## 2022-05-11 ENCOUNTER — ANESTHESIA EVENT (OUTPATIENT)
Dept: PERIOP | Facility: HOSPITAL | Age: 62
End: 2022-05-11

## 2022-05-11 ENCOUNTER — HOSPITAL ENCOUNTER (OUTPATIENT)
Facility: HOSPITAL | Age: 62
Setting detail: HOSPITAL OUTPATIENT SURGERY
Discharge: HOME | End: 2022-05-11
Attending: SURGERY | Admitting: SURGERY

## 2022-05-11 ENCOUNTER — ANESTHESIA (OUTPATIENT)
Dept: PERIOP | Facility: HOSPITAL | Age: 62
End: 2022-05-11

## 2022-05-11 VITALS
DIASTOLIC BLOOD PRESSURE: 80 MMHG | BODY MASS INDEX: 25.53 KG/M2 | HEIGHT: 68 IN | SYSTOLIC BLOOD PRESSURE: 128 MMHG | WEIGHT: 168.43 LBS | TEMPERATURE: 97.3 F | OXYGEN SATURATION: 94 % | HEART RATE: 86 BPM | RESPIRATION RATE: 16 BRPM

## 2022-05-11 DIAGNOSIS — R22.0 MASS OF POSTAURICULAR AREA: ICD-10-CM

## 2022-05-11 PROCEDURE — 25010000002 FENTANYL CITRATE (PF) 50 MCG/ML SOLUTION: Performed by: NURSE ANESTHETIST, CERTIFIED REGISTERED

## 2022-05-11 PROCEDURE — 25010000002 PROPOFOL 10 MG/ML EMULSION: Performed by: NURSE ANESTHETIST, CERTIFIED REGISTERED

## 2022-05-11 PROCEDURE — 21012 EXC FACE LES SBQ 2 CM/>: CPT | Performed by: SPECIALIST/TECHNOLOGIST, OTHER

## 2022-05-11 PROCEDURE — 25010000002 CEFAZOLIN IN DEXTROSE 2-4 GM/100ML-% SOLUTION: Performed by: SURGERY

## 2022-05-11 PROCEDURE — 88304 TISSUE EXAM BY PATHOLOGIST: CPT | Performed by: SURGERY

## 2022-05-11 PROCEDURE — 11426 EXC H-F-NK-SP B9+MARG >4 CM: CPT | Performed by: SURGERY

## 2022-05-11 PROCEDURE — 25010000002 MIDAZOLAM PER 1 MG: Performed by: ANESTHESIOLOGY

## 2022-05-11 PROCEDURE — 25010000002 DEXAMETHASONE PER 1 MG: Performed by: NURSE ANESTHETIST, CERTIFIED REGISTERED

## 2022-05-11 RX ORDER — SUCCINYLCHOLINE/SOD CL,ISO/PF 100 MG/5ML
SYRINGE (ML) INTRAVENOUS AS NEEDED
Status: DISCONTINUED | OUTPATIENT
Start: 2022-05-11 | End: 2022-05-11 | Stop reason: SURG

## 2022-05-11 RX ORDER — LIDOCAINE HYDROCHLORIDE 20 MG/ML
INJECTION, SOLUTION EPIDURAL; INFILTRATION; INTRACAUDAL; PERINEURAL AS NEEDED
Status: DISCONTINUED | OUTPATIENT
Start: 2022-05-11 | End: 2022-05-11 | Stop reason: SURG

## 2022-05-11 RX ORDER — GLYCOPYRROLATE 0.2 MG/ML
0.2 INJECTION INTRAMUSCULAR; INTRAVENOUS
Status: COMPLETED | OUTPATIENT
Start: 2022-05-11 | End: 2022-05-11

## 2022-05-11 RX ORDER — PROMETHAZINE HYDROCHLORIDE 12.5 MG/1
25 TABLET ORAL ONCE AS NEEDED
Status: DISCONTINUED | OUTPATIENT
Start: 2022-05-11 | End: 2022-05-11 | Stop reason: HOSPADM

## 2022-05-11 RX ORDER — SODIUM CHLORIDE, SODIUM LACTATE, POTASSIUM CHLORIDE, CALCIUM CHLORIDE 600; 310; 30; 20 MG/100ML; MG/100ML; MG/100ML; MG/100ML
9 INJECTION, SOLUTION INTRAVENOUS CONTINUOUS PRN
Status: DISCONTINUED | OUTPATIENT
Start: 2022-05-11 | End: 2022-05-11 | Stop reason: HOSPADM

## 2022-05-11 RX ORDER — CEFAZOLIN SODIUM 2 G/100ML
2 INJECTION, SOLUTION INTRAVENOUS ONCE
Status: COMPLETED | OUTPATIENT
Start: 2022-05-11 | End: 2022-05-11

## 2022-05-11 RX ORDER — ROCURONIUM BROMIDE 10 MG/ML
INJECTION, SOLUTION INTRAVENOUS AS NEEDED
Status: DISCONTINUED | OUTPATIENT
Start: 2022-05-11 | End: 2022-05-11 | Stop reason: SURG

## 2022-05-11 RX ORDER — ONDANSETRON 2 MG/ML
4 INJECTION INTRAMUSCULAR; INTRAVENOUS ONCE AS NEEDED
Status: DISCONTINUED | OUTPATIENT
Start: 2022-05-11 | End: 2022-05-11 | Stop reason: HOSPADM

## 2022-05-11 RX ORDER — ACETAMINOPHEN 500 MG
1000 TABLET ORAL ONCE
Status: COMPLETED | OUTPATIENT
Start: 2022-05-11 | End: 2022-05-11

## 2022-05-11 RX ORDER — MEPERIDINE HYDROCHLORIDE 25 MG/ML
12.5 INJECTION INTRAMUSCULAR; INTRAVENOUS; SUBCUTANEOUS
Status: DISCONTINUED | OUTPATIENT
Start: 2022-05-11 | End: 2022-05-11 | Stop reason: HOSPADM

## 2022-05-11 RX ORDER — MAGNESIUM HYDROXIDE 1200 MG/15ML
LIQUID ORAL AS NEEDED
Status: DISCONTINUED | OUTPATIENT
Start: 2022-05-11 | End: 2022-05-11 | Stop reason: HOSPADM

## 2022-05-11 RX ORDER — HYDROCODONE BITARTRATE AND ACETAMINOPHEN 5; 325 MG/1; MG/1
1-2 TABLET ORAL EVERY 4 HOURS PRN
Qty: 20 TABLET | Refills: 0 | Status: SHIPPED | OUTPATIENT
Start: 2022-05-11 | End: 2022-06-02

## 2022-05-11 RX ORDER — FENTANYL CITRATE 50 UG/ML
INJECTION, SOLUTION INTRAMUSCULAR; INTRAVENOUS AS NEEDED
Status: DISCONTINUED | OUTPATIENT
Start: 2022-05-11 | End: 2022-05-11 | Stop reason: SURG

## 2022-05-11 RX ORDER — MIDAZOLAM HYDROCHLORIDE 1 MG/ML
2 INJECTION INTRAMUSCULAR; INTRAVENOUS ONCE
Status: COMPLETED | OUTPATIENT
Start: 2022-05-11 | End: 2022-05-11

## 2022-05-11 RX ORDER — DEXAMETHASONE SODIUM PHOSPHATE 4 MG/ML
INJECTION, SOLUTION INTRA-ARTICULAR; INTRALESIONAL; INTRAMUSCULAR; INTRAVENOUS; SOFT TISSUE AS NEEDED
Status: DISCONTINUED | OUTPATIENT
Start: 2022-05-11 | End: 2022-05-11 | Stop reason: SURG

## 2022-05-11 RX ORDER — PROMETHAZINE HYDROCHLORIDE 25 MG/1
25 SUPPOSITORY RECTAL ONCE AS NEEDED
Status: DISCONTINUED | OUTPATIENT
Start: 2022-05-11 | End: 2022-05-11 | Stop reason: HOSPADM

## 2022-05-11 RX ORDER — BUPIVACAINE HYDROCHLORIDE 2.5 MG/ML
INJECTION, SOLUTION EPIDURAL; INFILTRATION; INTRACAUDAL AS NEEDED
Status: DISCONTINUED | OUTPATIENT
Start: 2022-05-11 | End: 2022-05-11 | Stop reason: HOSPADM

## 2022-05-11 RX ORDER — PROPOFOL 10 MG/ML
VIAL (ML) INTRAVENOUS AS NEEDED
Status: DISCONTINUED | OUTPATIENT
Start: 2022-05-11 | End: 2022-05-11 | Stop reason: SURG

## 2022-05-11 RX ORDER — OXYCODONE HYDROCHLORIDE 5 MG/1
5 TABLET ORAL
Status: DISCONTINUED | OUTPATIENT
Start: 2022-05-11 | End: 2022-05-11 | Stop reason: HOSPADM

## 2022-05-11 RX ADMIN — Medication 100 MG: at 14:12

## 2022-05-11 RX ADMIN — DEXAMETHASONE SODIUM PHOSPHATE 8 MG: 4 INJECTION, SOLUTION INTRA-ARTICULAR; INTRALESIONAL; INTRAMUSCULAR; INTRAVENOUS; SOFT TISSUE at 14:22

## 2022-05-11 RX ADMIN — GLYCOPYRROLATE 0.2 MG: 0.2 INJECTION INTRAMUSCULAR; INTRAVENOUS at 14:00

## 2022-05-11 RX ADMIN — ROCURONIUM BROMIDE 45 MG: 10 INJECTION INTRAVENOUS at 14:21

## 2022-05-11 RX ADMIN — OXYCODONE HYDROCHLORIDE 5 MG: 5 TABLET ORAL at 16:15

## 2022-05-11 RX ADMIN — PROPOFOL 200 MG: 10 INJECTION, EMULSION INTRAVENOUS at 14:12

## 2022-05-11 RX ADMIN — SODIUM CHLORIDE, POTASSIUM CHLORIDE, SODIUM LACTATE AND CALCIUM CHLORIDE 9 ML/HR: 600; 310; 30; 20 INJECTION, SOLUTION INTRAVENOUS at 13:09

## 2022-05-11 RX ADMIN — ROCURONIUM BROMIDE 5 MG: 10 INJECTION INTRAVENOUS at 14:12

## 2022-05-11 RX ADMIN — MIDAZOLAM HYDROCHLORIDE 2 MG: 1 INJECTION, SOLUTION INTRAMUSCULAR; INTRAVENOUS at 14:00

## 2022-05-11 RX ADMIN — ACETAMINOPHEN 1000 MG: 500 TABLET ORAL at 12:59

## 2022-05-11 RX ADMIN — CEFAZOLIN SODIUM 2 G: 2 INJECTION, SOLUTION INTRAVENOUS at 14:11

## 2022-05-11 RX ADMIN — SUGAMMADEX 200 MG: 100 INJECTION, SOLUTION INTRAVENOUS at 15:42

## 2022-05-11 RX ADMIN — FENTANYL CITRATE 100 MCG: 50 INJECTION, SOLUTION INTRAMUSCULAR; INTRAVENOUS at 14:12

## 2022-05-11 RX ADMIN — LIDOCAINE HYDROCHLORIDE 50 MG: 20 INJECTION, SOLUTION EPIDURAL; INFILTRATION; INTRACAUDAL; PERINEURAL at 14:12

## 2022-05-11 NOTE — ANESTHESIA POSTPROCEDURE EVALUATION
Patient: Vijay Mathew    Procedure Summary     Date: 05/11/22 Room / Location: [facility]  / [facility]    Anesthesia Start: 1408 Anesthesia Stop: 1603    Procedure: excision of LEFT postauricular subcutaneous mass (Left ) Diagnosis:       Mass of postauricular area      (Mass of postauricular area [R22.0])    Surgeons: Brad Cline MD Provider: Dale Gutierrez MD    Anesthesia Type: general ASA Status: 3          Anesthesia Type: general    Vitals  Vitals Value Taken Time   /81 05/11/22 1616   Temp     Pulse 94 05/11/22 1619   Resp     SpO2 96 % 05/11/22 1619   Vitals shown include unvalidated device data.        Post Anesthesia Care and Evaluation    Patient location during evaluation: bedside  Patient participation: complete - patient participated  Level of consciousness: awake, responsive to verbal stimuli, responsive to light touch, responsive to noxious stimuli, responsive to painful stimuli and responsive to physical stimuli  Pain score: 2  Pain management: adequate  Airway patency: patent  Anesthetic complications: No anesthetic complications  PONV Status: none  Cardiovascular status: acceptable and stable  Respiratory status: acceptable and room air  Hydration status: acceptable    Comments: An Anesthesiologist personally participated in the most demanding procedures (including induction and emergence if applicable) in the anesthesia plan, monitored the course of anesthesia administration at frequent intervals and remained physically present and available for immediate diagnosis and treatment of emergencies.

## 2022-05-11 NOTE — DISCHARGE INSTRUCTIONS
DISCHARGE INSTRUCTIONS  SURGICAL / AMBULATORY  PROCEDURES      For your surgery you had:  General anesthesia (you may have a sore throat for the first 24 hours)  IV sedation.  Local anesthesia  Monitored anesthesia Care  You received a medicated patch for nausea prevention today (behind your ear). It is recommended that you remove it 24-48 hours post-operatively. It must be removed within 72 hours.   You have received an anesthesia medication today that can cause hormonal forms of birth control to be ineffective. You should use a different form of birth control (to prevent pregnancy) for 7 days.   You may experience dizziness, drowsiness, or light-headedness for several hours following surgery/procedure.  Do not stay alone today or tonight.  Limit your activity for 24 hours.  Resume your diet slowly.  Follow whatever special dietary instructions you may have been given by your doctor.  You should not drive or operate machinery, drink alcohol, or sign legally binding documents for 24 hours or while you are taking pain medication.  Last dose of pain medication was given at:   .  NOTIFY YOUR DOCTOR IF YOU EXPERIENCE ANY OF THE FOLLOWING:  Temperature greater than 101 degrees Fahrenheit  Shaking Chills  Redness or excessive drainage from incision  Nausea, vomiting and/or pain that is not controlled by prescribed medications  Increase in bleeding or bleeding that is excessive  Unable to urinate in 6 hours after surgery  If unable to reach your doctor, please go to the closest Emergency Room  You may begin dressing changes:     [] in 24 hours   [] in 48 hours   [] Other:    You may remove Band-Aid/dressing tomorrow.  You may shower or bathe   .  Apply an ice pack 24-48 hours.  Medications per physician instructions as indicated on Discharge Medication Information Sheet.  You should see   for follow-up care   on   .  Phone number:       SPECIAL INSTRUCTIONS:                                    Dr. Cline's  Instructions          DIET  Resume your regular diet.     ACTIVITY  Avoid any activity whereby you would get sweaty or be in a mich or dirty environment for the next 3 weeks.      WOUND CARE, SHOWERING/BATHING, and DRAIN CARE  The incision is covered with quite a bit of bacitracin ointment.  Excessive bacitracin ointment was applied today after surgery to help with hemostasis.  Tomorrow morning, use sterile gauze to gently wipe off the majority of the ointment and then use a sterile Q-tip to lightly coat the entire incision with bacitracin ointment.   Lightly coat the incision with bacitracin ointment using a sterile Q-tip once daily until instructed otherwise by Dr. Cline.  You will receive sterile q-tips, sterile gauze, and bacitracin ointment from the nursing staff for you to use at home.  Wait until Saturdays, May 14 to shower, and keep the wound covered with something to keep it from getting very wet when you do shower.  Clean the area where the drain exits your skin with alcohol one time daily.  Care for the drain as you were instructed by the nursing staff.     HOME MEDICATIONS  Resume all your normal home medications.     PAIN CONTROL  You will receive a narcotic pain medicine prescription before you are discharged home.  Be sure to take the narcotic pain medication with some food so as not to upset your stomach.  Do not drive while you are taking the prescription pain medication.  Take Tylenol or Motrin for mild pain.     BOWEL MOVEMENTS  It is not unusual for narcotic pain medications to cause constipation.  Also, the medications and anesthesia you received for your surgery can have a constipating effect.  To help avoid constipation, drink at least four eight-ounce glasses of water each day and use over-the-counter laxatives/stool softeners (dulcolax, milk of magnesia, senokot, etc.).  I recommend drinking the aforementioned amount of water daily and taking 30 ml of milk of magnesia two times each day  while you are using the prescribed narcotic pain medication.  If your bowel movements become too loose or too frequent, then simply stop following these recommendations unless you start to feel constipated.     FOLLOW-UP VISIT & QUESTIONS/CONCERNS  Call Dr. Renee office at [phone] and schedule a follow-up appointment on Tuesday, May 17.  Should you have any questions or concerns or have any other problems you think need medical attention, please call Dr. Renee office.

## 2022-05-11 NOTE — OP NOTE
EXCISION LESION  Procedure Report    Patient Name:  Vijay Mathew  YOB: 1960    Date of Surgery:  5/11/2022     Pre-op Diagnosis:   Mass of postauricular area [R22.0]    Pre-Op Diagnosis Codes:     * Mass of postauricular area [R22.0]       Post-op Diagnosis:   Post-Op Diagnosis Codes:     * Mass of postauricular area [R22.0]    Procedure(s):  Excision of left postauricular subcutaneous mass  Excision of redundant skin    Staff:  Surgeon(s):  Brad Cline MD    Assistant: Horacio Yuan CSA    Anesthesia: Monitored Anesthesia Care    Estimated Blood Loss: 100 mL    Complications:  None    Drains:  10 Faroese    Packing:  None    Implants:    Nothing was implanted during the procedure    Specimen:          Specimens     ID Source Type Tests Collected By Collected At Frozen?    A Ear, Left Tissue · TISSUE PATHOLOGY EXAM   Brad Cline MD 5/11/22 [id number] No    Description: Left postauricular mass    This specimen was not marked as sent.           Indications: 62-year-old male who has a large left postauricular mass with exam characteristics consistent with lipoma.     Findings: Multilobulated fatty consistency subcutaneous mass excised from the left postauricular area.  Mass was about 13 cm long x 8 cm wide.  Gross characteristics were consistent with a lipoma.  An area of skin approximately 9 cm long by 4 cm wide had to be excised to remove redundant skin.    Description of Procedure: The patient was taken to the operating placed supine on the operative table.  Timeout was performed.  General anesthesia was administered.  Patient left ear and area behind the left ear was prepped and draped in usual fashion.  An incision was marked and then a knife was used to cut through the skin into the subcutaneous tissue and a large fatty consistency multilobulated subcutaneous mass was excised from the left postauricular area.  See above findings section.  There was quite a bit of redundant skin.  So  much so that there would not be a satisfactory cosmetic outcome without removal of the redundant skin.   The edundant skin was excised until a sufficient amount was removed to create a cosmetically acceptable closure.  In total, an area of skin about 9 cm long by 4 cm wide was excised.  A 10 [place] drain was placed in the wound and exteriorized through the skin at the mid left neck.  The fabian was secured to the skin with nylon suture.  The incision was then closed with a combination of multiple interrupted 3-0 and 4-0 nylon sutures.  Adequate hemostasis.  Bacitracin ointment was applied to the wound.  Patient was awakened anesthesia and transported to the recovery in stable condition.  Sponge needle and instrument counts were correct.  No complications.    Assistant: Horacio Yuan CSA  was responsible for performing the following activities: Retraction, Closing and Placing Dressing and his skilled assistance was necessary for the success of this case.    Brad Cline MD     Date: 5/11/2022  Time: 16:00 EDT

## 2022-05-11 NOTE — ANESTHESIA PREPROCEDURE EVALUATION
Anesthesia Evaluation     Patient summary reviewed and Nursing notes reviewed                Airway   Mallampati: II  TM distance: >3 FB  Neck ROM: full  No difficulty expected  Dental    (+) lower dentures and upper dentures    Pulmonary - normal exam    breath sounds clear to auscultation  (+) COPD,   Cardiovascular - normal exam    Rhythm: regular  Rate: normal    (+) hypertension, hyperlipidemia,       Neuro/Psych  (+) psychiatric history,    GI/Hepatic/Renal/Endo - negative ROS     Musculoskeletal (-) negative ROS    Abdominal    Substance History - negative use     OB/GYN negative ob/gyn ROS         Other                        Anesthesia Plan    ASA 3     general     intravenous induction     Anesthetic plan, all risks, benefits, and alternatives have been provided, discussed and informed consent has been obtained with: patient.        CODE STATUS:

## 2022-05-13 LAB
CYTO UR: NORMAL
LAB AP CASE REPORT: NORMAL
LAB AP CLINICAL INFORMATION: NORMAL
PATH REPORT.FINAL DX SPEC: NORMAL
PATH REPORT.GROSS SPEC: NORMAL

## 2022-05-17 ENCOUNTER — OFFICE VISIT (OUTPATIENT)
Dept: SURGERY | Facility: CLINIC | Age: 62
End: 2022-05-17

## 2022-05-17 VITALS — RESPIRATION RATE: 16 BRPM | BODY MASS INDEX: 25.61 KG/M2 | WEIGHT: 169 LBS | HEIGHT: 68 IN

## 2022-05-17 DIAGNOSIS — Z09 ENCOUNTER FOR FOLLOW-UP: Primary | ICD-10-CM

## 2022-05-17 PROCEDURE — 99024 POSTOP FOLLOW-UP VISIT: CPT | Performed by: SURGERY

## 2022-05-17 NOTE — PROGRESS NOTES
Patient is here for follow-up after I removed a large subcutaneous mass from behind his left ear.  A drain was placed.  There has been minimal output.  I removed the drain today without difficulty.  The incision is healing very well thus far.  I will have the patient see me again this Friday to remove some of the sutures.

## 2022-05-20 ENCOUNTER — OFFICE VISIT (OUTPATIENT)
Dept: SURGERY | Facility: CLINIC | Age: 62
End: 2022-05-20

## 2022-05-20 VITALS — RESPIRATION RATE: 16 BRPM | WEIGHT: 169 LBS | HEIGHT: 68 IN | BODY MASS INDEX: 25.61 KG/M2

## 2022-05-20 DIAGNOSIS — Z09 ENCOUNTER FOR FOLLOW-UP: Primary | ICD-10-CM

## 2022-05-20 PROCEDURE — 99024 POSTOP FOLLOW-UP VISIT: CPT | Performed by: SURGERY

## 2022-05-20 NOTE — PROGRESS NOTES
Patient is here for follow-up after I removed a large lipoma from behind his left ear on 5/11/2022.  The wound is healing very well thus far.  Patient has no complaints.  I removed about 1/2 to 2/3 of the sutures today.  I will have the patient follow-up with Krista Pyle in my absence to have the remaining sutures removed.

## 2022-05-27 RX ORDER — ATORVASTATIN CALCIUM 10 MG/1
10 TABLET, FILM COATED ORAL DAILY
Qty: 30 TABLET | Refills: 2 | Status: SHIPPED | OUTPATIENT
Start: 2022-05-27 | End: 2022-08-30 | Stop reason: SDUPTHER

## 2022-05-27 NOTE — TELEPHONE ENCOUNTER
Caller: Vijay Mathew    Relationship: Self    Best call back number: [phone]    Requested Prescriptions:   Requested Prescriptions     Pending Prescriptions Disp Refills   • atorvastatin (LIPITOR) 10 MG tablet 30 tablet 2     Sig: Take 1 tablet by mouth Daily.        Pharmacy where request should be sent: [address] [phone] [facility] [phone] [facility]     Does the patient have less than a 3 day supply:  [x] Yes  [] No    Seamus Baptiste Rep   05/27/22 10:35 EDT         
Sudden numbness or weakness of the face, arm, or leg, especially on one side of the body. Confusion, trouble speaking or understanding. Trouble seeing in one or both eyes. Trouble walking, dizziness, loss of balance or coordination. Severe headache.

## 2022-05-31 ENCOUNTER — OFFICE VISIT (OUTPATIENT)
Dept: SURGERY | Facility: CLINIC | Age: 62
End: 2022-05-31

## 2022-05-31 VITALS — BODY MASS INDEX: 25.76 KG/M2 | WEIGHT: 170 LBS | HEIGHT: 68 IN | RESPIRATION RATE: 18 BRPM | HEART RATE: 92 BPM

## 2022-05-31 DIAGNOSIS — Z86.018 S/P EXCISION OF LIPOMA: Primary | ICD-10-CM

## 2022-05-31 DIAGNOSIS — Z98.890 S/P EXCISION OF LIPOMA: Primary | ICD-10-CM

## 2022-05-31 PROCEDURE — 99024 POSTOP FOLLOW-UP VISIT: CPT | Performed by: NURSE PRACTITIONER

## 2022-06-01 NOTE — PROGRESS NOTES
Chief Complaint: Suture / Staple Removal    Subjective      Follow-up visit       History of Present Illness  Vijay Mathew is a 62 y.o. male presents to [facility] for follow-up visit.    Patient presents today for follow-up visit after under going an excision of a left posterior auricular subcutaneous mass on 5/11/2022 performed by Dr. Brad Cline.  Pathology was consistent with a lipoma.     Patient was seen previously last week and had several sutures removed by Dr. Cline.    He currently denies any fever or chills.    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, but [place]- hearing aid right ear    • History of kidney stones    • History of rheumatic fever     NO HEART  EFFECTS   • Hyperlipidemia    • Hypertension    • Sinus trouble    • Smoker        Past Surgical History:   Procedure Laterality Date   • EXCISION LESION Left 5/11/2022    Procedure: excision of LEFT postauricular subcutaneous mass;  Surgeon: Brad Cline MD;  Location: [facility] [place];  Service: General;  Laterality: Left;   • INGUINAL HERNIA REPAIR Left 3/4/2022    Procedure: ROBOTIC LEFT INGUINAL HERNIA REPAIR WITH MESH PLACEMENT;  Surgeon: Brad Cline MD;  Location: [facility] OR;  Service: Robotics - [facility];  Laterality: Left;       Outpatient Medications Marked as Taking for the 5/31/22 encounter (Office Visit) with Krista Pyle APRN   Medication Sig Dispense Refill   • APPLE CIDER VINEGAR PO Take 1 teaspoon(s) by mouth Daily.     • Ascorbic Acid (VITAMIN C PO) Take  by mouth.     • atorvastatin (LIPITOR) 10 MG tablet Take 1 tablet by mouth Daily. 30 tablet 2   • losartan (Cozaar) 100 MG tablet Take 0.5 tablets by  "mouth Daily. (Patient taking differently: Take 50 mg by mouth Daily. INST PER ANESTHESIA PROTOCOL) 45 tablet 1   • Multiple Vitamins-Minerals (EMERGEN-C IMMUNE PLUS PO) Take  by mouth.     • VITAMIN D PO Take  by mouth.     • ZINC CITRATE PO Take  by mouth.         No Known Allergies     Family History   Problem Relation Age of Onset   • Malig Hyperthermia Neg Hx        Social History     Socioeconomic History   • Marital status: Single   Tobacco Use   • Smoking status: Current Every Day Smoker     Packs/day: 1.00     Years: 45.00     Pack years: 45.00     Types: Cigarettes     Start date: 1977   • Smokeless tobacco: Never Used   • Tobacco comment: 1600 3/3/22, INST PER ANESTHESIA PROTOCOL   Vaping Use   • Vaping Use: Never used   Substance and Sexual Activity   • Alcohol use: Not Currently   • Drug use: Never   • Sexual activity: Defer       Review of Systems   Constitutional: Negative for chills and fever.   HENT: Negative for ear discharge, ear pain and hearing loss.         Vital Signs:   Pulse 92   Resp 18   Ht 172.7 cm (68\")   Wt 77.1 kg (170 lb)   BMI 25.85 kg/m²      Physical Exam  Constitutional:       Appearance: Normal appearance.   HENT:      Head: Normocephalic.   Cardiovascular:      Rate and Rhythm: Normal rate.   Pulmonary:      Effort: Pulmonary effort is normal.   Abdominal:      General: Abdomen is flat.      Palpations: Abdomen is soft.   Skin:     General: Skin is warm and dry.   Neurological:      General: No focal deficit present.      Mental Status: He is alert and oriented to person, place, and time.   Psychiatric:         Mood and Affect: Mood normal.         Thought Content: Thought content normal.          Result Review :          []  Laboratory  []  Radiology  [x]  Pathology  []  Microbiology  []  EKG/Telemetry   []  Cardiology/Vascular   [x]  Old records  Today have reviewed Dr. Cline's previous operative and pathology report     Assessment and Plan    Diagnoses and all orders for " this visit:    1. S/P excision of lipoma (Primary)        Follow Up   Return for Scheduled follow-up appointment with Dr. Cline on 6/13/2022.     Seek medical emergency services:  Fever greater than 101 associated with chills.  Extreme pain.    Patient was given instructions and counseling regarding his condition or for health maintenance advice. Please see specific information pulled into the AVS if appropriate.

## 2022-06-02 ENCOUNTER — OFFICE VISIT (OUTPATIENT)
Dept: FAMILY MEDICINE CLINIC | Facility: CLINIC | Age: 62
End: 2022-06-02

## 2022-06-02 VITALS
DIASTOLIC BLOOD PRESSURE: 79 MMHG | SYSTOLIC BLOOD PRESSURE: 137 MMHG | BODY MASS INDEX: 25.67 KG/M2 | OXYGEN SATURATION: 97 % | RESPIRATION RATE: 18 BRPM | HEART RATE: 83 BPM | TEMPERATURE: 99.1 F | WEIGHT: 169.4 LBS | HEIGHT: 68 IN

## 2022-06-02 DIAGNOSIS — I10 PRIMARY HYPERTENSION: Chronic | ICD-10-CM

## 2022-06-02 DIAGNOSIS — B02.9 HERPES ZOSTER WITHOUT COMPLICATION: Primary | ICD-10-CM

## 2022-06-02 DIAGNOSIS — F17.219 CIGARETTE NICOTINE DEPENDENCE WITH NICOTINE-INDUCED DISORDER: Chronic | ICD-10-CM

## 2022-06-02 PROCEDURE — 99214 OFFICE O/P EST MOD 30 MIN: CPT | Performed by: FAMILY MEDICINE

## 2022-06-02 RX ORDER — VALACYCLOVIR HYDROCHLORIDE 1 G/1
1000 TABLET, FILM COATED ORAL 2 TIMES DAILY
Qty: 42 TABLET | Refills: 0 | Status: SHIPPED | OUTPATIENT
Start: 2022-06-02 | End: 2022-06-27 | Stop reason: SDUPTHER

## 2022-06-02 NOTE — PROGRESS NOTES
"Chief Complaint  Rash (On neck, spreading towards neck, started yesterday around 1200)    Subjective        Vijay Mathew presents to [facility]  He is here today for an acute visit. He has COPD, HTN and tobacco abuse. He does not have a past family history of cancer. He has smoked for over 40 years.      His home blood pressure is running 135/85.      He is trying to cut back smoking.     He has a rash on his right neck that appeared yesterday. He denies pain, itching or burning.      The patient has no other complaints today and denies chest pain, shortness of breath, weakness, numbness, nausea, vomiting, diarrhea, dizziness or syncopal event.      Objective   Vital Signs:  /79 (BP Location: Left arm, Patient Position: Sitting)   Pulse 83   Temp 99.1 °F (37.3 °C)   Resp 18   Ht 172.7 cm (68\")   Wt 76.8 kg (169 lb 6.4 oz)   SpO2 97%   BMI 25.76 kg/m²           Physical Exam  Vitals reviewed.   Constitutional:       Appearance: Normal appearance. He is well-developed.   HENT:      Head: Normocephalic and atraumatic.        Comments: hepatoform rash of the right neck and upper shoulder.      Right Ear: External ear normal.      Left Ear: External ear normal.      Mouth/Throat:      Pharynx: No oropharyngeal exudate.   Eyes:      Conjunctiva/sclera: Conjunctivae normal.      Pupils: Pupils are equal, round, and reactive to light.   Neck:      Vascular: No carotid bruit.   Cardiovascular:      Rate and Rhythm: Normal rate and regular rhythm.      Heart sounds: No murmur heard.    No friction rub. No gallop.   Pulmonary:      Effort: Pulmonary effort is normal.      Breath sounds: Normal breath sounds. No wheezing or rhonchi.   Abdominal:      General: There is no distension.   Skin:     General: Skin is warm and dry.   Neurological:      Mental Status: He is alert and oriented to person, place, and time.      Cranial Nerves: No cranial nerve deficit.      Motor: No " weakness.   Psychiatric:         Mood and Affect: Mood and affect normal.         Behavior: Behavior normal.         Thought Content: Thought content normal.         Judgment: Judgment normal.        Result Review :    CMP    CMP 10/28/21 2/22/22 4/11/22   Glucose 106 (A)  111 (A)   BUN 19  19   Creatinine 0.98 1.00 0.86   eGFR Non African Am 78     Sodium 140  142   Potassium 4.1  3.9   Chloride 105  105   Calcium 9.3  9.4   Albumin 4.50  4.60   Total Bilirubin 0.3  0.4   Alkaline Phosphatase 84  95   AST (SGOT) 21  21   ALT (SGPT) 18  17   (A) Abnormal value       Comments are available for some flowsheets but are not being displayed.           CBC    CBC 10/28/21   WBC 8.00   RBC 5.32   Hemoglobin 15.8   Hematocrit 46.8   MCV 88.0   MCH 29.7   MCHC 33.8   RDW 14.6   Platelets 171           Lipid Panel    Lipid Panel 10/28/21 4/11/22   Total Cholesterol 225 (A) 152   Triglycerides 262 (A) 215 (A)   HDL Cholesterol 30 (A) 30 (A)   VLDL Cholesterol 48 (A) 37   LDL Cholesterol  147 (A) 85   LDL/HDL Ratio 4.75 2.63   (A) Abnormal value            TSH    TSH 10/28/21   TSH 1.850                     Assessment and Plan   Diagnoses and all orders for this visit:    1. Herpes zoster without complication (Primary)  Assessment & Plan:  He was put on valtrex today. He was told to follow-up in two weeks.       2. Cigarette nicotine dependence with nicotine-induced disorder  Assessment & Plan:  Tobacco use is unchanged.  Smoking cessation counseling was provided.  Tobacco use will be reassessed at the next regular appointment.      3. Primary hypertension  Assessment & Plan:  Hypertension is improving with treatment.  Continue current treatment regimen.  Dietary sodium restriction.  Blood pressure will be reassessed at the next regular appointment.      Other orders  -     valACYclovir (Valtrex) 1000 MG tablet; Take 1 tablet by mouth 2 (Two) Times a Day.  Dispense: 42 tablet; Refill: 0           Follow Up   Return in about 2  weeks (around 6/16/2022).  Patient was given instructions and counseling regarding his condition or for health maintenance advice. Please see specific information pulled into the AVS if appropriate.

## 2022-06-13 ENCOUNTER — OFFICE VISIT (OUTPATIENT)
Dept: SURGERY | Facility: CLINIC | Age: 62
End: 2022-06-13

## 2022-06-13 VITALS — RESPIRATION RATE: 16 BRPM | WEIGHT: 169 LBS | HEIGHT: 68 IN | BODY MASS INDEX: 25.61 KG/M2

## 2022-06-13 DIAGNOSIS — Z09 ENCOUNTER FOR FOLLOW-UP: Primary | ICD-10-CM

## 2022-06-13 PROCEDURE — 99024 POSTOP FOLLOW-UP VISIT: CPT | Performed by: SURGERY

## 2022-06-13 NOTE — PROGRESS NOTES
Vijay is here for another follow-up regarding excision of the mass behind his left ear.  Incision has completely healed and there are no issues regarding that but he does now have shingles affecting the right side of his neck but this is already being addressed by Dr. Robbins.  Patient is fully recovered from his surgery.  I will have him see me again in about 2 months so I can look at the area 1 more time.

## 2022-06-16 ENCOUNTER — OFFICE VISIT (OUTPATIENT)
Dept: FAMILY MEDICINE CLINIC | Facility: CLINIC | Age: 62
End: 2022-06-16

## 2022-06-16 VITALS
OXYGEN SATURATION: 96 % | HEIGHT: 68 IN | WEIGHT: 170.1 LBS | DIASTOLIC BLOOD PRESSURE: 71 MMHG | SYSTOLIC BLOOD PRESSURE: 126 MMHG | RESPIRATION RATE: 16 BRPM | HEART RATE: 85 BPM | TEMPERATURE: 97.6 F | BODY MASS INDEX: 25.78 KG/M2

## 2022-06-16 DIAGNOSIS — I10 PRIMARY HYPERTENSION: Chronic | ICD-10-CM

## 2022-06-16 DIAGNOSIS — E78.2 MIXED HYPERLIPIDEMIA: Chronic | ICD-10-CM

## 2022-06-16 DIAGNOSIS — F17.219 CIGARETTE NICOTINE DEPENDENCE WITH NICOTINE-INDUCED DISORDER: Chronic | ICD-10-CM

## 2022-06-16 DIAGNOSIS — B02.9 HERPES ZOSTER WITHOUT COMPLICATION: Primary | ICD-10-CM

## 2022-06-16 PROBLEM — H61.92 LESION OF LEFT EARLOBE: Chronic | Status: RESOLVED | Noted: 2021-11-10 | Resolved: 2022-06-16

## 2022-06-16 PROCEDURE — 99214 OFFICE O/P EST MOD 30 MIN: CPT | Performed by: FAMILY MEDICINE

## 2022-06-16 NOTE — PROGRESS NOTES
"Chief Complaint  Herpes Zoster (Shingles )    Subjective        Vijay Mathew presents to [facility]  He is here today for management of his chronic medical conditoins. He has COPD, HTN and tobacco abuse. He does not have a past family history of cancer. He has smoked for over 40 years.      His home blood pressure is running 135/85.      He is trying to cut back smoking.      He still has a rash on his right neck. He is having pain and burning. He is taking valtrex. His pain is improving.      The patient has no other complaints today and denies chest pain, shortness of breath, weakness, numbness, nausea, vomiting, diarrhea, dizziness or syncopal event.      Objective   Vital Signs:  /71 (BP Location: Left arm, Patient Position: Sitting)   Pulse 85   Temp 97.6 °F (36.4 °C)   Resp 16   Ht 172.7 cm (67.99\")   Wt 77.2 kg (170 lb 1.6 oz)   SpO2 96%   BMI 25.87 kg/m²   Estimated body mass index is 25.87 kg/m² as calculated from the following:    Height as of this encounter: 172.7 cm (67.99\").    Weight as of this encounter: 77.2 kg (170 lb 1.6 oz).          Physical Exam  Vitals reviewed.   Constitutional:       Appearance: Normal appearance. He is well-developed.   HENT:      Head: Normocephalic and atraumatic.      Right Ear: External ear normal.      Left Ear: External ear normal.      Mouth/Throat:      Pharynx: No oropharyngeal exudate.   Eyes:      Conjunctiva/sclera: Conjunctivae normal.      Pupils: Pupils are equal, round, and reactive to light.   Neck:      Vascular: No carotid bruit.   Cardiovascular:      Rate and Rhythm: Normal rate and regular rhythm.      Heart sounds: No murmur heard.    No friction rub. No gallop.   Pulmonary:      Effort: Pulmonary effort is normal.      Breath sounds: Normal breath sounds. No wheezing or rhonchi.   Abdominal:      General: There is no distension.   Skin:     General: Skin is warm and dry.   Neurological:      Mental " Status: He is alert and oriented to person, place, and time.      Cranial Nerves: No cranial nerve deficit.      Motor: No weakness.   Psychiatric:         Mood and Affect: Mood and affect normal.         Behavior: Behavior normal.         Thought Content: Thought content normal.         Judgment: Judgment normal.        Result Review :    CMP    CMP 10/28/21 2/22/22 4/11/22   Glucose 106 (A)  111 (A)   BUN 19  19   Creatinine 0.98 1.00 0.86   eGFR Non African Am 78     Sodium 140  142   Potassium 4.1  3.9   Chloride 105  105   Calcium 9.3  9.4   Albumin 4.50  4.60   Total Bilirubin 0.3  0.4   Alkaline Phosphatase 84  95   AST (SGOT) 21  21   ALT (SGPT) 18  17   (A) Abnormal value       Comments are available for some flowsheets but are not being displayed.           CBC    CBC 10/28/21   WBC 8.00   RBC 5.32   Hemoglobin 15.8   Hematocrit 46.8   MCV 88.0   MCH 29.7   MCHC 33.8   RDW 14.6   Platelets 171           Lipid Panel    Lipid Panel 10/28/21 4/11/22   Total Cholesterol 225 (A) 152   Triglycerides 262 (A) 215 (A)   HDL Cholesterol 30 (A) 30 (A)   VLDL Cholesterol 48 (A) 37   LDL Cholesterol  147 (A) 85   LDL/HDL Ratio 4.75 2.63   (A) Abnormal value            TSH    TSH 10/28/21   TSH 1.850                     Assessment and Plan   Diagnoses and all orders for this visit:    1. Herpes zoster without complication (Primary)  Comments:  The patient was instructed to  over-the-counter capsulation for his pain.  He will be continued on Valtrex for now for suppression.    2. Cigarette nicotine dependence with nicotine-induced disorder  Assessment & Plan:  Tobacco use is unchanged.  Smoking cessation counseling was provided.  Tobacco use will be reassessed at the next regular appointment.      3. Mixed hyperlipidemia  Assessment & Plan:  Lipid abnormalities are improving with treatment.  Nutritional counseling was provided. and Pharmacotherapy as ordered.  Lipids will be reassessed in 6 months.      4. Primary  hypertension  Assessment & Plan:  Hypertension is improving with treatment.  Continue current treatment regimen.  Dietary sodium restriction.  Weight loss.  Blood pressure will be reassessed at the next regular appointment.             Follow Up   Return in about 6 months (around 12/16/2022).  Patient was given instructions and counseling regarding his condition or for health maintenance advice. Please see specific information pulled into the AVS if appropriate.

## 2022-06-27 ENCOUNTER — TELEPHONE (OUTPATIENT)
Dept: FAMILY MEDICINE CLINIC | Facility: CLINIC | Age: 62
End: 2022-06-27

## 2022-06-27 RX ORDER — VALACYCLOVIR HYDROCHLORIDE 1 G/1
1000 TABLET, FILM COATED ORAL 2 TIMES DAILY
Qty: 28 TABLET | Refills: 0 | Status: SHIPPED | OUTPATIENT
Start: 2022-06-27 | End: 2022-07-15 | Stop reason: SDUPTHER

## 2022-07-07 ENCOUNTER — OFFICE VISIT (OUTPATIENT)
Dept: FAMILY MEDICINE CLINIC | Facility: CLINIC | Age: 62
End: 2022-07-07

## 2022-07-07 VITALS
OXYGEN SATURATION: 99 % | BODY MASS INDEX: 26.84 KG/M2 | RESPIRATION RATE: 20 BRPM | DIASTOLIC BLOOD PRESSURE: 71 MMHG | SYSTOLIC BLOOD PRESSURE: 139 MMHG | TEMPERATURE: 97.6 F | HEART RATE: 80 BPM | HEIGHT: 67 IN | WEIGHT: 171 LBS

## 2022-07-07 DIAGNOSIS — I10 PRIMARY HYPERTENSION: Chronic | ICD-10-CM

## 2022-07-07 DIAGNOSIS — B02.9 HERPES ZOSTER WITHOUT COMPLICATION: Primary | ICD-10-CM

## 2022-07-07 DIAGNOSIS — F17.219 CIGARETTE NICOTINE DEPENDENCE WITH NICOTINE-INDUCED DISORDER: Chronic | ICD-10-CM

## 2022-07-07 PROCEDURE — 99214 OFFICE O/P EST MOD 30 MIN: CPT | Performed by: FAMILY MEDICINE

## 2022-07-07 NOTE — ASSESSMENT & PLAN NOTE
His shingles is improving with Valtrex as prescribed. We will follow-up with him at his next visit.

## 2022-07-07 NOTE — PROGRESS NOTES
"Chief Complaint  Herpes Zoster (Shingles x5 weeks), Hypertension (Following up for elevated blood pressure.), and Hyperlipidemia    Subjective        Vijay Mathew presents to [facility]  He is here today for management of his chronic medical conditoins. He has COPD, HTN and tobacco abuse. He does not have a past family history of cancer. He has smoked for over 40 years.      His home blood pressure is running 135/85.      He is trying to cut back smoking.     He is still having a rash on his right neck. He says that it comes and goes.      The patient has no other complaints today and denies chest pain, shortness of breath, weakness, numbness, nausea, vomiting, diarrhea, dizziness or syncopal event.         Objective   Vital Signs:  /71   Pulse 80   Temp 97.6 °F (36.4 °C)   Resp 20   Ht 170.2 cm (67\")   Wt 77.6 kg (171 lb)   SpO2 99%   BMI 26.78 kg/m²   Estimated body mass index is 26.78 kg/m² as calculated from the following:    Height as of this encounter: 170.2 cm (67\").    Weight as of this encounter: 77.6 kg (171 lb).          Physical Exam  Vitals reviewed.   Constitutional:       Appearance: Normal appearance. He is well-developed.   HENT:      Head: Normocephalic and atraumatic.      Right Ear: External ear normal.      Left Ear: External ear normal.      Mouth/Throat:      Pharynx: No oropharyngeal exudate.   Eyes:      Conjunctiva/sclera: Conjunctivae normal.      Pupils: Pupils are equal, round, and reactive to light.   Neck:      Vascular: No carotid bruit.   Cardiovascular:      Rate and Rhythm: Normal rate and regular rhythm.      Heart sounds: No murmur heard.    No friction rub. No gallop.   Pulmonary:      Effort: Pulmonary effort is normal.      Breath sounds: Normal breath sounds. No wheezing or rhonchi.   Abdominal:      General: There is no distension.   Skin:     General: Skin is warm and dry.   Neurological:      Mental Status: He is alert and " oriented to person, place, and time.      Cranial Nerves: No cranial nerve deficit.      Motor: No weakness.   Psychiatric:         Mood and Affect: Mood and affect normal.         Behavior: Behavior normal.         Thought Content: Thought content normal.         Judgment: Judgment normal.        Result Review :    CMP    CMP 10/28/21 2/22/22 4/11/22   Glucose 106 (A)  111 (A)   BUN 19  19   Creatinine 0.98 1.00 0.86   eGFR Non African Am 78     Sodium 140  142   Potassium 4.1  3.9   Chloride 105  105   Calcium 9.3  9.4   Albumin 4.50  4.60   Total Bilirubin 0.3  0.4   Alkaline Phosphatase 84  95   AST (SGOT) 21  21   ALT (SGPT) 18  17   (A) Abnormal value       Comments are available for some flowsheets but are not being displayed.           CBC    CBC 10/28/21   WBC 8.00   RBC 5.32   Hemoglobin 15.8   Hematocrit 46.8   MCV 88.0   MCH 29.7   MCHC 33.8   RDW 14.6   Platelets 171           Lipid Panel    Lipid Panel 10/28/21 4/11/22   Total Cholesterol 225 (A) 152   Triglycerides 262 (A) 215 (A)   HDL Cholesterol 30 (A) 30 (A)   VLDL Cholesterol 48 (A) 37   LDL Cholesterol  147 (A) 85   LDL/HDL Ratio 4.75 2.63   (A) Abnormal value            TSH    TSH 10/28/21   TSH 1.850                     Assessment and Plan   Diagnoses and all orders for this visit:    1. Herpes zoster without complication (Primary)  Assessment & Plan:  His shingles is improving with Valtrex as prescribed. We will follow-up with him at his next visit.       2. Primary hypertension  Assessment & Plan:  Hypertension is improving with treatment.  Continue current treatment regimen.  Dietary sodium restriction.  Weight loss.  Blood pressure will be reassessed at the next regular appointment.      3. Cigarette nicotine dependence with nicotine-induced disorder  Assessment & Plan:  Tobacco use is unchanged.  Smoking cessation counseling was provided.  Tobacco use will be reassessed at the next regular appointment.             Follow Up   Return if  symptoms worsen or fail to improve.  Patient was given instructions and counseling regarding his condition or for health maintenance advice. Please see specific information pulled into the AVS if appropriate.

## 2022-07-15 ENCOUNTER — TELEPHONE (OUTPATIENT)
Dept: FAMILY MEDICINE CLINIC | Facility: CLINIC | Age: 62
End: 2022-07-15

## 2022-07-15 RX ORDER — VALACYCLOVIR HYDROCHLORIDE 1 G/1
TABLET, FILM COATED ORAL
Qty: 58 TABLET | Refills: 0 | Status: SHIPPED | OUTPATIENT
Start: 2022-07-15

## 2022-08-08 ENCOUNTER — OFFICE VISIT (OUTPATIENT)
Dept: SURGERY | Facility: CLINIC | Age: 62
End: 2022-08-08

## 2022-08-08 VITALS — RESPIRATION RATE: 16 BRPM | WEIGHT: 168 LBS | HEIGHT: 67 IN | BODY MASS INDEX: 26.37 KG/M2

## 2022-08-08 DIAGNOSIS — Z09 ENCOUNTER FOR FOLLOW-UP: Primary | ICD-10-CM

## 2022-08-08 PROCEDURE — 99024 POSTOP FOLLOW-UP VISIT: CPT | Performed by: SURGERY

## 2022-08-08 NOTE — PROGRESS NOTES
Patient is here for another follow-up after I removed a large lesion from behind his left ear.  He continues to do great.  The incision is healed well and there is a very nice cosmetic outcome.  Patient seems very pleased with his progress.  No new issues to address except I mentioned to the patient that he really should get a colonoscopy as he has never had one.  He does not want to schedule anything now.  I told him we would make a note and contact him sometime next year and see if he would be willing to have one done.  Otherwise, patient will see me PRN.

## 2022-08-30 ENCOUNTER — TELEPHONE (OUTPATIENT)
Dept: FAMILY MEDICINE CLINIC | Facility: CLINIC | Age: 62
End: 2022-08-30

## 2022-08-30 RX ORDER — ATORVASTATIN CALCIUM 10 MG/1
10 TABLET, FILM COATED ORAL DAILY
Qty: 90 TABLET | Refills: 1 | Status: SHIPPED | OUTPATIENT
Start: 2022-08-30 | End: 2023-02-21 | Stop reason: SDUPTHER

## 2022-10-06 ENCOUNTER — TELEPHONE (OUTPATIENT)
Dept: FAMILY MEDICINE CLINIC | Facility: CLINIC | Age: 62
End: 2022-10-06

## 2022-10-06 RX ORDER — LOSARTAN POTASSIUM 100 MG/1
TABLET ORAL
Qty: 90 TABLET | Refills: 1 | Status: SHIPPED | OUTPATIENT
Start: 2022-10-06 | End: 2023-02-21 | Stop reason: SDUPTHER

## 2023-01-09 ENCOUNTER — OFFICE VISIT (OUTPATIENT)
Dept: FAMILY MEDICINE CLINIC | Facility: CLINIC | Age: 63
End: 2023-01-09
Payer: COMMERCIAL

## 2023-01-09 VITALS
HEIGHT: 67 IN | SYSTOLIC BLOOD PRESSURE: 110 MMHG | WEIGHT: 167.5 LBS | RESPIRATION RATE: 18 BRPM | OXYGEN SATURATION: 93 % | DIASTOLIC BLOOD PRESSURE: 73 MMHG | TEMPERATURE: 97.5 F | HEART RATE: 86 BPM | BODY MASS INDEX: 26.29 KG/M2

## 2023-01-09 DIAGNOSIS — E78.2 MIXED HYPERLIPIDEMIA: Chronic | ICD-10-CM

## 2023-01-09 DIAGNOSIS — F17.219 CIGARETTE NICOTINE DEPENDENCE WITH NICOTINE-INDUCED DISORDER: Chronic | ICD-10-CM

## 2023-01-09 DIAGNOSIS — Z11.59 NEED FOR HEPATITIS C SCREENING TEST: ICD-10-CM

## 2023-01-09 DIAGNOSIS — I10 PRIMARY HYPERTENSION: Chronic | ICD-10-CM

## 2023-01-09 DIAGNOSIS — J44.9 COPD, MILD: Chronic | ICD-10-CM

## 2023-01-09 DIAGNOSIS — L98.9 SKIN LESION OF HAND: Primary | ICD-10-CM

## 2023-01-09 DIAGNOSIS — Z12.5 SCREENING FOR PROSTATE CANCER: ICD-10-CM

## 2023-01-09 PROCEDURE — 99214 OFFICE O/P EST MOD 30 MIN: CPT | Performed by: FAMILY MEDICINE

## 2023-01-09 NOTE — ASSESSMENT & PLAN NOTE
Lipid abnormalities are improving with treatment.  Nutritional counseling was provided. and Pharmacotherapy as ordered.  Lipids will be reassessed in 6 months.    The 10-year ASCVD risk score (Qian AMBROSE, et al., 2019) is: 15.9%    Values used to calculate the score:      Age: 62 years      Sex: Male      Is Non- : No      Diabetic: No      Tobacco smoker: Yes      Systolic Blood Pressure: 110 mmHg      Is BP treated: Yes      HDL Cholesterol: 30 mg/dL      Total Cholesterol: 152 mg/dL

## 2023-01-09 NOTE — PROGRESS NOTES
"Chief Complaint  Hypertension, Hyperlipidemia, Annual Exam (Physical ), and Herpes Zoster (Neck )    Subjective        Vijay Mathew presents to [facility]  History of Present Illness  He is here today for management of his chronic medical conditoins. He has COPD, HTN and tobacco abuse. He does not have a past family history of cancer. He has smoked for over 40 years.      His home blood pressure is running 135/85.      He is trying to cut back smoking.      He is complaining of skin changes on his left thumb that has been present for the past 6 months.     The patient has no other complaints today and denies chest pain, shortness of breath, weakness, numbness, nausea, vomiting, diarrhea, dizziness or syncopal event.      Objective   Vital Signs:  /73 (BP Location: Left arm, Patient Position: Sitting)   Pulse 86   Temp 97.5 °F (36.4 °C)   Resp 18   Ht 170.2 cm (67.01\")   Wt 76 kg (167 lb 8 oz)   SpO2 93%   BMI 26.23 kg/m²   Estimated body mass index is 26.23 kg/m² as calculated from the following:    Height as of this encounter: 170.2 cm (67.01\").    Weight as of this encounter: 76 kg (167 lb 8 oz).          Physical Exam  Vitals reviewed.   Constitutional:       Appearance: Normal appearance. He is well-developed.   HENT:      Head: Normocephalic and atraumatic.      Right Ear: External ear normal.      Left Ear: External ear normal.      Mouth/Throat:      Pharynx: No oropharyngeal exudate.   Eyes:      Conjunctiva/sclera: Conjunctivae normal.      Pupils: Pupils are equal, round, and reactive to light.   Neck:      Vascular: No carotid bruit.   Cardiovascular:      Rate and Rhythm: Normal rate and regular rhythm.      Heart sounds: No murmur heard.    No friction rub. No gallop.   Pulmonary:      Effort: Pulmonary effort is normal.      Breath sounds: Normal breath sounds. No wheezing or rhonchi.   Abdominal:      General: There is no distension.   Skin:     " General: Skin is warm and dry.          Neurological:      Mental Status: He is alert and oriented to person, place, and time.      Cranial Nerves: No cranial nerve deficit.      Motor: No weakness.   Psychiatric:         Mood and Affect: Mood and affect normal.         Behavior: Behavior normal.         Thought Content: Thought content normal.         Judgment: Judgment normal.        Result Review :    CMP    CMP 2/22/22 4/11/22   Glucose  111 (A)   BUN  19   Creatinine 1.00 0.86   eGFR  97.3   Sodium  142   Potassium  3.9   Chloride  105   Calcium  9.4   Total Protein  7.2   Albumin  4.60   Globulin  2.6   Total Bilirubin  0.4   Alkaline Phosphatase  95   AST (SGOT)  21   ALT (SGPT)  17   Albumin/Globulin Ratio  1.8   BUN/Creatinine Ratio  22.1   Anion Gap  10.8   (A) Abnormal value       Comments are available for some flowsheets but are not being displayed.               Lipid Panel    Lipid Panel 4/11/22   Total Cholesterol 152   Triglycerides 215 (A)   HDL Cholesterol 30 (A)   VLDL Cholesterol 37   LDL Cholesterol  85   LDL/HDL Ratio 2.63   (A) Abnormal value                          Assessment and Plan   Diagnoses and all orders for this visit:    1. Skin lesion of hand (Primary)  Comments:  conserning for cancer, referral placed to derm.   Orders:  -     Ambulatory Referral to Dermatology    2. Primary hypertension  Assessment & Plan:  Hypertension is improving with treatment.  Continue current treatment regimen.  Dietary sodium restriction.  Weight loss.  Blood pressure will be reassessed at the next regular appointment.    Orders:  -     CBC & Differential; Future  -     TSH; Future    3. Need for hepatitis C screening test  -     Hepatitis C Antibody; Future    4. Mixed hyperlipidemia  Assessment & Plan:  Lipid abnormalities are improving with treatment.  Nutritional counseling was provided. and Pharmacotherapy as ordered.  Lipids will be reassessed in 6 months.    The 10-year ASCVD risk score (Qian  ARNOL, et al., 2019) is: 15.9%    Values used to calculate the score:      Age: 62 years      Sex: Male      Is Non- : No      Diabetic: No      Tobacco smoker: Yes      Systolic Blood Pressure: 110 mmHg      Is BP treated: Yes      HDL Cholesterol: 30 mg/dL      Total Cholesterol: 152 mg/dL      Orders:  -     Comprehensive Metabolic Panel; Future  -     Lipid Panel; Future    5. COPD, mild (HCC)    6. Screening for prostate cancer  -     PSA Screen; Future    7. Cigarette nicotine dependence with nicotine-induced disorder  Assessment & Plan:  Tobacco use is unchanged.  Smoking cessation counseling was provided.  Tobacco use will be reassessed at the next regular appointment.             Follow Up   Return in about 3 months (around 4/9/2023).  Patient was given instructions and counseling regarding his condition or for health maintenance advice. Please see specific information pulled into the AVS if appropriate.

## 2023-01-17 ENCOUNTER — TELEPHONE (OUTPATIENT)
Dept: FAMILY MEDICINE CLINIC | Facility: CLINIC | Age: 63
End: 2023-01-17

## 2023-01-17 NOTE — TELEPHONE ENCOUNTER
Hub staff attempted to follow warm transfer process and was unsuccessful     Caller: Vijay Mathew    Relationship to patient: Self    Best call back number: [phone]     Patient is needing:PATIENT DOES NOT KNOW THE NAME WHO CALLED BUT HE IS RETURNING PHONE CALL TO OFFICE     PLEASE ADVISE

## 2023-02-21 ENCOUNTER — TELEPHONE (OUTPATIENT)
Dept: FAMILY MEDICINE CLINIC | Facility: CLINIC | Age: 63
End: 2023-02-21
Payer: COMMERCIAL

## 2023-02-21 RX ORDER — ATORVASTATIN CALCIUM 10 MG/1
10 TABLET, FILM COATED ORAL DAILY
Qty: 90 TABLET | Refills: 1 | Status: SHIPPED | OUTPATIENT
Start: 2023-02-21

## 2023-02-21 RX ORDER — LOSARTAN POTASSIUM 100 MG/1
TABLET ORAL
Qty: 90 TABLET | Refills: 1 | Status: SHIPPED | OUTPATIENT
Start: 2023-02-21

## 2023-02-21 NOTE — TELEPHONE ENCOUNTER
Caller: Vijay Mathew    Relationship: Self    Best call back number: [phone]    Requested Prescriptions:   Requested Prescriptions     Pending Prescriptions Disp Refills   • atorvastatin (LIPITOR) 10 MG tablet 90 tablet 1     Sig: Take 1 tablet by mouth Daily.   • losartan (Cozaar) 100 MG tablet 90 tablet 1     Sig: Take 1 tab daily        Pharmacy where request should be sent: [address] [phone] [facility] [phone] [facility]       Does the patient have less than a 3 day supply:  [] Yes  [x] No    Would you like a call back once the refill request has been completed: [x] Yes [] No    If the office needs to give you a call back, can they leave a voicemail: [x] Yes [] No    Seamus Nagy Rep   02/21/23 14:31 EST

## 2023-02-21 NOTE — TELEPHONE ENCOUNTER
Spoke with patient, he has orders in his chart that were placed on 1/9/23.  He will come get these done about 1 week prior to his appt on 4/10/23.

## 2023-02-21 NOTE — TELEPHONE ENCOUNTER
Caller: Vijay aMthew    Relationship: Self    Best call back number: [phone]    What orders are you requesting (i.e. lab or imaging): LABS    In what timeframe would the patient need to come in: BEFORE 4.10.23    Where will you receive your lab/imaging services: [facility]    Additional notes: PATIENT CALLED AND ASKED IF HE CAN/NEEDS TO GET LABS DONE BEFORE HIS APPOINTMENT IN April?

## 2023-03-20 ENCOUNTER — LAB (OUTPATIENT)
Dept: LAB | Facility: HOSPITAL | Age: 63
End: 2023-03-20
Payer: COMMERCIAL

## 2023-03-20 DIAGNOSIS — I10 PRIMARY HYPERTENSION: ICD-10-CM

## 2023-03-20 DIAGNOSIS — Z11.59 NEED FOR HEPATITIS C SCREENING TEST: ICD-10-CM

## 2023-03-20 DIAGNOSIS — E78.2 MIXED HYPERLIPIDEMIA: Chronic | ICD-10-CM

## 2023-03-20 DIAGNOSIS — Z12.5 SCREENING FOR PROSTATE CANCER: ICD-10-CM

## 2023-03-20 LAB
ALBUMIN SERPL-MCNC: 4.5 G/DL (ref 3.5–5.2)
ALBUMIN/GLOB SERPL: 1.7 G/DL
ALP SERPL-CCNC: 82 U/L (ref 39–117)
ALT SERPL W P-5'-P-CCNC: 22 U/L (ref 1–41)
ANION GAP SERPL CALCULATED.3IONS-SCNC: 10.2 MMOL/L (ref 5–15)
AST SERPL-CCNC: 20 U/L (ref 1–40)
BASOPHILS # BLD AUTO: 0.06 10*3/MM3 (ref 0–0.2)
BASOPHILS NFR BLD AUTO: 0.7 % (ref 0–1.5)
BILIRUB SERPL-MCNC: 0.3 MG/DL (ref 0–1.2)
BUN SERPL-MCNC: 17 MG/DL (ref 8–23)
BUN/CREAT SERPL: 13.3 (ref 7–25)
CALCIUM SPEC-SCNC: 10.7 MG/DL (ref 8.6–10.5)
CHLORIDE SERPL-SCNC: 104 MMOL/L (ref 98–107)
CHOLEST SERPL-MCNC: 162 MG/DL (ref 0–200)
CO2 SERPL-SCNC: 26.8 MMOL/L (ref 22–29)
CREAT SERPL-MCNC: 1.28 MG/DL (ref 0.76–1.27)
DEPRECATED RDW RBC AUTO: 42.6 FL (ref 37–54)
EGFRCR SERPLBLD CKD-EPI 2021: 62.5 ML/MIN/1.73
EOSINOPHIL # BLD AUTO: 0.08 10*3/MM3 (ref 0–0.4)
EOSINOPHIL NFR BLD AUTO: 0.9 % (ref 0.3–6.2)
ERYTHROCYTE [DISTWIDTH] IN BLOOD BY AUTOMATED COUNT: 13.2 % (ref 12.3–15.4)
GLOBULIN UR ELPH-MCNC: 2.7 GM/DL
GLUCOSE SERPL-MCNC: 88 MG/DL (ref 65–99)
HCT VFR BLD AUTO: 44.6 % (ref 37.5–51)
HCV AB SER DONR QL: NORMAL
HDLC SERPL-MCNC: 32 MG/DL (ref 40–60)
HGB BLD-MCNC: 15.4 G/DL (ref 13–17.7)
IMM GRANULOCYTES # BLD AUTO: 0.05 10*3/MM3 (ref 0–0.05)
IMM GRANULOCYTES NFR BLD AUTO: 0.6 % (ref 0–0.5)
LDLC SERPL CALC-MCNC: 85 MG/DL (ref 0–100)
LDLC/HDLC SERPL: 2.38 {RATIO}
LYMPHOCYTES # BLD AUTO: 2.3 10*3/MM3 (ref 0.7–3.1)
LYMPHOCYTES NFR BLD AUTO: 27.3 % (ref 19.6–45.3)
MCH RBC QN AUTO: 30.9 PG (ref 26.6–33)
MCHC RBC AUTO-ENTMCNC: 34.5 G/DL (ref 31.5–35.7)
MCV RBC AUTO: 89.4 FL (ref 79–97)
MONOCYTES # BLD AUTO: 0.72 10*3/MM3 (ref 0.1–0.9)
MONOCYTES NFR BLD AUTO: 8.5 % (ref 5–12)
NEUTROPHILS NFR BLD AUTO: 5.23 10*3/MM3 (ref 1.7–7)
NEUTROPHILS NFR BLD AUTO: 62 % (ref 42.7–76)
NRBC BLD AUTO-RTO: 0 /100 WBC (ref 0–0.2)
PLATELET # BLD AUTO: 165 10*3/MM3 (ref 140–450)
PMV BLD AUTO: 12.2 FL (ref 6–12)
POTASSIUM SERPL-SCNC: 4.2 MMOL/L (ref 3.5–5.2)
PROT SERPL-MCNC: 7.2 G/DL (ref 6–8.5)
PSA SERPL-MCNC: 1.98 NG/ML (ref 0–4)
RBC # BLD AUTO: 4.99 10*6/MM3 (ref 4.14–5.8)
SODIUM SERPL-SCNC: 141 MMOL/L (ref 136–145)
TRIGL SERPL-MCNC: 270 MG/DL (ref 0–150)
TSH SERPL DL<=0.05 MIU/L-ACNC: 1.37 UIU/ML (ref 0.27–4.2)
VLDLC SERPL-MCNC: 45 MG/DL (ref 5–40)
WBC NRBC COR # BLD: 8.44 10*3/MM3 (ref 3.4–10.8)

## 2023-03-20 PROCEDURE — 80061 LIPID PANEL: CPT

## 2023-03-20 PROCEDURE — 84443 ASSAY THYROID STIM HORMONE: CPT

## 2023-03-20 PROCEDURE — 80053 COMPREHEN METABOLIC PANEL: CPT

## 2023-03-20 PROCEDURE — 85025 COMPLETE CBC W/AUTO DIFF WBC: CPT

## 2023-03-20 PROCEDURE — 36415 COLL VENOUS BLD VENIPUNCTURE: CPT

## 2023-03-20 PROCEDURE — G0103 PSA SCREENING: HCPCS

## 2023-03-20 PROCEDURE — 86803 HEPATITIS C AB TEST: CPT

## 2023-04-10 ENCOUNTER — OFFICE VISIT (OUTPATIENT)
Dept: FAMILY MEDICINE CLINIC | Facility: CLINIC | Age: 63
End: 2023-04-10
Payer: COMMERCIAL

## 2023-04-10 VITALS
TEMPERATURE: 97.6 F | DIASTOLIC BLOOD PRESSURE: 77 MMHG | RESPIRATION RATE: 18 BRPM | HEIGHT: 67 IN | SYSTOLIC BLOOD PRESSURE: 128 MMHG | OXYGEN SATURATION: 95 % | HEART RATE: 68 BPM | WEIGHT: 170.9 LBS | BODY MASS INDEX: 26.82 KG/M2

## 2023-04-10 DIAGNOSIS — Z00.00 ANNUAL PHYSICAL EXAM: Primary | ICD-10-CM

## 2023-04-10 DIAGNOSIS — F17.219 CIGARETTE NICOTINE DEPENDENCE WITH NICOTINE-INDUCED DISORDER: Chronic | ICD-10-CM

## 2023-04-10 DIAGNOSIS — E78.2 MIXED HYPERLIPIDEMIA: Chronic | ICD-10-CM

## 2023-04-10 DIAGNOSIS — I10 PRIMARY HYPERTENSION: Chronic | ICD-10-CM

## 2023-04-10 DIAGNOSIS — J44.9 COPD, MILD: Chronic | ICD-10-CM

## 2023-04-10 DIAGNOSIS — Z87.891 SMOKING HISTORY: ICD-10-CM

## 2023-04-10 PROBLEM — B02.9 HERPES ZOSTER WITHOUT COMPLICATION: Chronic | Status: RESOLVED | Noted: 2022-06-02 | Resolved: 2023-04-10

## 2023-04-10 RX ORDER — LOSARTAN POTASSIUM 100 MG/1
TABLET ORAL
Qty: 90 TABLET | Refills: 3 | Status: SHIPPED | OUTPATIENT
Start: 2023-04-10

## 2023-04-10 RX ORDER — ATORVASTATIN CALCIUM 20 MG/1
20 TABLET, FILM COATED ORAL DAILY
Qty: 90 TABLET | Refills: 3 | Status: SHIPPED | OUTPATIENT
Start: 2023-04-10

## 2023-04-10 NOTE — ASSESSMENT & PLAN NOTE
The patient was encouraged to always wear their seatbelt and never text and drive.  They were encouraged to get 7 to 8 hours sleep at night.  They were encouraged to exercise on a regular basis.  Screening labs were reviewed at today's visit and manage according to findings.

## 2023-04-10 NOTE — ASSESSMENT & PLAN NOTE
Hypertension is improving with treatment.  Continue current treatment regimen.  Dietary sodium restriction.  Weight loss.  Blood pressure will be reassessed at the next regular appointment.    The 10-year ASCVD risk score (Qian AMBROSE, et al., 2019) is: 21.4%    Values used to calculate the score:      Age: 63 years      Sex: Male      Is Non- : No      Diabetic: No      Tobacco smoker: Yes      Systolic Blood Pressure: 128 mmHg      Is BP treated: Yes      HDL Cholesterol: 32 mg/dL      Total Cholesterol: 162 mg/dL

## 2023-04-10 NOTE — PROGRESS NOTES
"Chief Complaint  Hypertension    Subjective        Vijay Scott Mathew presents to [facility]  History of Present Illness  He is here today for management of his chronic medical conditoins. He has COPD, HTN and tobacco abuse. He does not have a past family history of cancer. He has smoked for over 46 years one pack per day.      His home blood pressure is running 135/85.      He is trying to cut back smoking.      He is complaining of skin changes on his left thumb that has been present for the past 9 months. He also has another lesion on his right wrist.     The patient has no other complaints today and denies chest pain, shortness of breath, weakness, numbness, nausea, vomiting, diarrhea, dizziness or syncopal event.      Objective   Vital Signs:  /77 (BP Location: Left arm, Patient Position: Sitting, Cuff Size: Adult)   Pulse 68   Temp 97.6 °F (36.4 °C) (Temporal)   Resp 18   Ht 170.2 cm (67\")   Wt 77.5 kg (170 lb 14.4 oz)   SpO2 95%   BMI 26.77 kg/m²   Estimated body mass index is 26.77 kg/m² as calculated from the following:    Height as of this encounter: 170.2 cm (67\").    Weight as of this encounter: 77.5 kg (170 lb 14.4 oz).             Physical Exam  Vitals reviewed.   Constitutional:       Appearance: Normal appearance. He is well-developed.   HENT:      Head: Normocephalic and atraumatic.      Right Ear: External ear normal.      Left Ear: External ear normal.      Mouth/Throat:      Pharynx: No oropharyngeal exudate.   Eyes:      Conjunctiva/sclera: Conjunctivae normal.      Pupils: Pupils are equal, round, and reactive to light.   Neck:      Vascular: No carotid bruit.   Cardiovascular:      Rate and Rhythm: Normal rate and regular rhythm.      Heart sounds: No murmur heard.    No friction rub. No gallop.   Pulmonary:      Effort: Pulmonary effort is normal.      Breath sounds: Normal breath sounds. No wheezing or rhonchi.   Abdominal:      General: There is " no distension.   Skin:     General: Skin is warm and dry.   Neurological:      Mental Status: He is alert and oriented to person, place, and time.      Cranial Nerves: No cranial nerve deficit.      Motor: No weakness.   Psychiatric:         Mood and Affect: Mood and affect normal.         Behavior: Behavior normal.         Thought Content: Thought content normal.         Judgment: Judgment normal.        Result Review :    CMP    CMP 3/20/23   Glucose 88   BUN 17   Creatinine 1.28 (A)   eGFR 62.5   Sodium 141   Potassium 4.2   Chloride 104   Calcium 10.7 (A)   Total Protein 7.2   Albumin 4.5   Globulin 2.7   Total Bilirubin 0.3   Alkaline Phosphatase 82   AST (SGOT) 20   ALT (SGPT) 22   Albumin/Globulin Ratio 1.7   BUN/Creatinine Ratio 13.3   Anion Gap 10.2   (A) Abnormal value            CBC    CBC 3/20/23   WBC 8.44   RBC 4.99   Hemoglobin 15.4   Hematocrit 44.6   MCV 89.4   MCH 30.9   MCHC 34.5   RDW 13.2   Platelets 165           Lipid Panel    Lipid Panel 3/20/23   Total Cholesterol 162   Triglycerides 270 (A)   HDL Cholesterol 32 (A)   VLDL Cholesterol 45 (A)   LDL Cholesterol  85   LDL/HDL Ratio 2.38   (A) Abnormal value            TSH    TSH 3/20/23   TSH 1.370                        Assessment and Plan   Diagnoses and all orders for this visit:    1. Annual physical exam (Primary)  Assessment & Plan:  The patient was encouraged to always wear their seatbelt and never text and drive.  They were encouraged to get 7 to 8 hours sleep at night.  They were encouraged to exercise on a regular basis.  Screening labs were reviewed at today's visit and manage according to findings.        2. Primary hypertension  Assessment & Plan:  Hypertension is improving with treatment.  Continue current treatment regimen.  Dietary sodium restriction.  Weight loss.  Blood pressure will be reassessed at the next regular appointment.    The 10-year ASCVD risk score (Qian DK, et al., 2019) is: 21.4%    Values used to calculate  the score:      Age: 63 years      Sex: Male      Is Non- : No      Diabetic: No      Tobacco smoker: Yes      Systolic Blood Pressure: 128 mmHg      Is BP treated: Yes      HDL Cholesterol: 32 mg/dL      Total Cholesterol: 162 mg/dL        3. Mixed hyperlipidemia  Assessment & Plan:  Lipid abnormalities are improving with treatment.  Nutritional counseling was provided. and Pharmacotherapy as ordered.  Lipids will be reassessed in 6 months.      4. COPD, mild  Assessment & Plan:  COPD is unchanged.  Continue current medications.          5. Cigarette nicotine dependence with nicotine-induced disorder  Assessment & Plan:  Tobacco use is unchanged.  Smoking cessation counseling was provided.  Tobacco use will be reassessed at the next regular appointment.      6. Smoking history  -     CT Chest Low Dose Wo; Future    Other orders  -     atorvastatin (LIPITOR) 20 MG tablet; Take 1 tablet by mouth Daily.  Dispense: 90 tablet; Refill: 3  -     losartan (Cozaar) 100 MG tablet; Take 1 tab daily  Dispense: 90 tablet; Refill: 3           Follow Up   No follow-ups on file.  Patient was given instructions and counseling regarding his condition or for health maintenance advice. Please see specific information pulled into the AVS if appropriate.

## 2023-05-05 ENCOUNTER — HOSPITAL ENCOUNTER (OUTPATIENT)
Dept: CT IMAGING | Facility: HOSPITAL | Age: 63
Discharge: HOME | End: 2023-05-05
Payer: COMMERCIAL

## 2023-05-05 DIAGNOSIS — Z87.891 SMOKING HISTORY: ICD-10-CM

## 2023-05-05 PROCEDURE — 71271 CT THORAX LUNG CANCER SCR C-: CPT

## 2023-11-27 ENCOUNTER — HOSPITAL ENCOUNTER (EMERGENCY)
Facility: HOSPITAL | Age: 63
Discharge: LEFT BEFORE DISCHARGE | End: 2023-11-27
Payer: COMMERCIAL

## 2023-11-27 ENCOUNTER — APPOINTMENT (OUTPATIENT)
Dept: GENERAL RADIOLOGY | Facility: HOSPITAL | Age: 63
End: 2023-11-27
Payer: COMMERCIAL

## 2023-11-27 VITALS
SYSTOLIC BLOOD PRESSURE: 136 MMHG | HEIGHT: 67 IN | OXYGEN SATURATION: 97 % | DIASTOLIC BLOOD PRESSURE: 83 MMHG | HEART RATE: 96 BPM | TEMPERATURE: 98.1 F | WEIGHT: 179.68 LBS | RESPIRATION RATE: 20 BRPM | BODY MASS INDEX: 28.2 KG/M2

## 2023-11-27 LAB
ALBUMIN SERPL-MCNC: 4.4 G/DL (ref 3.5–5.2)
ALBUMIN/GLOB SERPL: 1.4 G/DL
ALP SERPL-CCNC: 97 U/L (ref 39–117)
ALT SERPL W P-5'-P-CCNC: 26 U/L (ref 1–41)
ANION GAP SERPL CALCULATED.3IONS-SCNC: 10.8 MMOL/L (ref 5–15)
AST SERPL-CCNC: 25 U/L (ref 1–40)
BASOPHILS # BLD AUTO: 0.05 10*3/MM3 (ref 0–0.2)
BASOPHILS NFR BLD AUTO: 0.5 % (ref 0–1.5)
BILIRUB SERPL-MCNC: 0.4 MG/DL (ref 0–1.2)
BUN SERPL-MCNC: 19 MG/DL (ref 8–23)
BUN/CREAT SERPL: 17.1 (ref 7–25)
CALCIUM SPEC-SCNC: 9.7 MG/DL (ref 8.6–10.5)
CHLORIDE SERPL-SCNC: 104 MMOL/L (ref 98–107)
CO2 SERPL-SCNC: 25.2 MMOL/L (ref 22–29)
CREAT SERPL-MCNC: 1.11 MG/DL (ref 0.76–1.27)
DEPRECATED RDW RBC AUTO: 45.1 FL (ref 37–54)
EGFRCR SERPLBLD CKD-EPI 2021: 74.2 ML/MIN/1.73
EOSINOPHIL # BLD AUTO: 0.05 10*3/MM3 (ref 0–0.4)
EOSINOPHIL NFR BLD AUTO: 0.5 % (ref 0.3–6.2)
ERYTHROCYTE [DISTWIDTH] IN BLOOD BY AUTOMATED COUNT: 13.7 % (ref 12.3–15.4)
GLOBULIN UR ELPH-MCNC: 3.1 GM/DL
GLUCOSE SERPL-MCNC: 93 MG/DL (ref 65–99)
HCT VFR BLD AUTO: 45.5 % (ref 37.5–51)
HGB BLD-MCNC: 15 G/DL (ref 13–17.7)
HOLD SPECIMEN: NORMAL
HOLD SPECIMEN: NORMAL
IMM GRANULOCYTES # BLD AUTO: 0.03 10*3/MM3 (ref 0–0.05)
IMM GRANULOCYTES NFR BLD AUTO: 0.3 % (ref 0–0.5)
LYMPHOCYTES # BLD AUTO: 2.01 10*3/MM3 (ref 0.7–3.1)
LYMPHOCYTES NFR BLD AUTO: 18.3 % (ref 19.6–45.3)
MAGNESIUM SERPL-MCNC: 1.9 MG/DL (ref 1.6–2.4)
MCH RBC QN AUTO: 29.5 PG (ref 26.6–33)
MCHC RBC AUTO-ENTMCNC: 33 G/DL (ref 31.5–35.7)
MCV RBC AUTO: 89.4 FL (ref 79–97)
MONOCYTES # BLD AUTO: 0.86 10*3/MM3 (ref 0.1–0.9)
MONOCYTES NFR BLD AUTO: 7.8 % (ref 5–12)
NEUTROPHILS NFR BLD AUTO: 72.6 % (ref 42.7–76)
NEUTROPHILS NFR BLD AUTO: 8 10*3/MM3 (ref 1.7–7)
NRBC BLD AUTO-RTO: 0 /100 WBC (ref 0–0.2)
PLATELET # BLD AUTO: 168 10*3/MM3 (ref 140–450)
PMV BLD AUTO: 11.4 FL (ref 6–12)
POTASSIUM SERPL-SCNC: 3.5 MMOL/L (ref 3.5–5.2)
PROT SERPL-MCNC: 7.5 G/DL (ref 6–8.5)
RBC # BLD AUTO: 5.09 10*6/MM3 (ref 4.14–5.8)
SODIUM SERPL-SCNC: 140 MMOL/L (ref 136–145)
TROPONIN T SERPL HS-MCNC: 11 NG/L
TSH SERPL DL<=0.05 MIU/L-ACNC: 1.38 UIU/ML (ref 0.27–4.2)
WBC NRBC COR # BLD AUTO: 11 10*3/MM3 (ref 3.4–10.8)
WHOLE BLOOD HOLD COAG: NORMAL
WHOLE BLOOD HOLD SPECIMEN: NORMAL

## 2023-11-27 PROCEDURE — 85025 COMPLETE CBC W/AUTO DIFF WBC: CPT

## 2023-11-27 PROCEDURE — 83735 ASSAY OF MAGNESIUM: CPT

## 2023-11-27 PROCEDURE — 71045 X-RAY EXAM CHEST 1 VIEW: CPT

## 2023-11-27 PROCEDURE — 99211 OFF/OP EST MAY X REQ PHY/QHP: CPT

## 2023-11-27 PROCEDURE — 84443 ASSAY THYROID STIM HORMONE: CPT

## 2023-11-27 PROCEDURE — 84484 ASSAY OF TROPONIN QUANT: CPT

## 2023-11-27 PROCEDURE — 93005 ELECTROCARDIOGRAM TRACING: CPT

## 2023-11-27 PROCEDURE — 80053 COMPREHEN METABOLIC PANEL: CPT

## 2023-11-27 RX ORDER — SODIUM CHLORIDE 0.9 % (FLUSH) 0.9 %
10 SYRINGE (ML) INJECTION AS NEEDED
Status: DISCONTINUED | OUTPATIENT
Start: 2023-11-27 | End: 2023-11-27 | Stop reason: HOSPADM

## 2023-11-30 LAB
QT INTERVAL: 339 MS
QTC INTERVAL: 426 MS

## 2023-12-06 ENCOUNTER — HOSPITAL ENCOUNTER (OUTPATIENT)
Dept: CT IMAGING | Facility: HOSPITAL | Age: 63
Discharge: HOME | End: 2023-12-06
Admitting: FAMILY MEDICINE
Payer: COMMERCIAL

## 2023-12-06 DIAGNOSIS — R91.1 LUNG NODULE: ICD-10-CM

## 2023-12-06 PROCEDURE — 71250 CT THORAX DX C-: CPT

## 2024-01-23 RX ORDER — LOSARTAN POTASSIUM 50 MG/1
TABLET ORAL
Qty: 90 TABLET | Refills: 3 | Status: SHIPPED | OUTPATIENT
Start: 2024-01-23

## 2024-02-26 ENCOUNTER — OFFICE VISIT (OUTPATIENT)
Dept: FAMILY MEDICINE CLINIC | Facility: CLINIC | Age: 64
End: 2024-02-26
Payer: MEDICARE

## 2024-02-26 VITALS
OXYGEN SATURATION: 97 % | HEART RATE: 77 BPM | BODY MASS INDEX: 26.21 KG/M2 | TEMPERATURE: 99.9 F | WEIGHT: 167 LBS | RESPIRATION RATE: 18 BRPM | HEIGHT: 67 IN | SYSTOLIC BLOOD PRESSURE: 135 MMHG | DIASTOLIC BLOOD PRESSURE: 75 MMHG

## 2024-02-26 DIAGNOSIS — R52 BODY ACHES: Primary | ICD-10-CM

## 2024-02-26 DIAGNOSIS — F17.219 CIGARETTE NICOTINE DEPENDENCE WITH NICOTINE-INDUCED DISORDER: Chronic | ICD-10-CM

## 2024-02-26 DIAGNOSIS — J01.00 ACUTE NON-RECURRENT MAXILLARY SINUSITIS: ICD-10-CM

## 2024-02-26 DIAGNOSIS — I10 PRIMARY HYPERTENSION: Chronic | ICD-10-CM

## 2024-02-26 DIAGNOSIS — R05.1 ACUTE COUGH: ICD-10-CM

## 2024-02-26 LAB
EXPIRATION DATE: NORMAL
EXPIRATION DATE: NORMAL
FLUAV AG NPH QL: NEGATIVE
FLUBV AG NPH QL: NEGATIVE
INTERNAL CONTROL: NORMAL
INTERNAL CONTROL: NORMAL
Lab: 7593
Lab: 8865
SARS-COV-2 AG UPPER RESP QL IA.RAPID: NOT DETECTED

## 2024-02-26 PROCEDURE — 87426 SARSCOV CORONAVIRUS AG IA: CPT | Performed by: NURSE PRACTITIONER

## 2024-02-26 PROCEDURE — 3075F SYST BP GE 130 - 139MM HG: CPT | Performed by: NURSE PRACTITIONER

## 2024-02-26 PROCEDURE — 99214 OFFICE O/P EST MOD 30 MIN: CPT | Performed by: NURSE PRACTITIONER

## 2024-02-26 PROCEDURE — 3078F DIAST BP <80 MM HG: CPT | Performed by: NURSE PRACTITIONER

## 2024-02-26 PROCEDURE — 87804 INFLUENZA ASSAY W/OPTIC: CPT | Performed by: NURSE PRACTITIONER

## 2024-02-26 RX ORDER — CEFDINIR 300 MG/1
300 CAPSULE ORAL 2 TIMES DAILY
Qty: 14 CAPSULE | Refills: 0 | Status: SHIPPED | OUTPATIENT
Start: 2024-02-26 | End: 2024-03-04

## 2024-02-26 RX ORDER — PREDNISONE 20 MG/1
40 TABLET ORAL DAILY
Qty: 10 TABLET | Refills: 0 | Status: SHIPPED | OUTPATIENT
Start: 2024-02-26 | End: 2024-03-02

## 2024-02-26 NOTE — PROGRESS NOTES
"Chief Complaint  Nasal Congestion, Cough, and Generalized Body Aches    Subjective        Vijay Scott Mathew presents to [facility]  History of Present Illness  Pt presents c/o myalgia, low-grade fever, sinus pain/pressure, cough, nasal congestion, x 1-2 weeks. Denies sore  throat, ear pain, n/v/d, chest pain, SOB or other. No known sick contacts, but did recently attend [employer]. Taking Mucinex to treat.     Reviewed all recent labs and medications.      Objective   Vital Signs:  /75 (BP Location: Left arm, Patient Position: Sitting, Cuff Size: Adult)   Pulse 77   Temp 99.9 °F (37.7 °C) (Temporal)   Resp 18   Ht 170.2 cm (67\")   Wt 75.8 kg (167 lb)   SpO2 97%   BMI 26.16 kg/m²   Estimated body mass index is 26.16 kg/m² as calculated from the following:    Height as of this encounter: 170.2 cm (67\").    Weight as of this encounter: 75.8 kg (167 lb).               Physical Exam  Vitals reviewed.   Constitutional:       General: He is not in acute distress.     Appearance: Normal appearance.   HENT:      Head: Normocephalic.      Right Ear: Tympanic membrane normal.      Left Ear: Tympanic membrane normal.      Nose: Nose normal. Congestion present.      Right Sinus: Maxillary sinus tenderness present.      Left Sinus: Maxillary sinus tenderness present.      Mouth/Throat:      Pharynx: Oropharynx is clear. No posterior oropharyngeal erythema.   Eyes:      General: No scleral icterus.     Extraocular Movements: Extraocular movements intact.      Conjunctiva/sclera: Conjunctivae normal.      Pupils: Pupils are equal, round, and reactive to light.   Cardiovascular:      Rate and Rhythm: Normal rate and regular rhythm.      Pulses: Normal pulses.      Heart sounds: Normal heart sounds.   Pulmonary:      Effort: Pulmonary effort is normal.      Breath sounds: Normal breath sounds.   Abdominal:      General: Bowel sounds are normal.      Palpations: Abdomen is soft. "   Musculoskeletal:         General: Normal range of motion.      Cervical back: Neck supple.   Skin:     General: Skin is warm and dry.   Neurological:      Mental Status: He is alert and oriented to person, place, and time.   Psychiatric:         Mood and Affect: Mood normal.         Behavior: Behavior normal.         Thought Content: Thought content normal.         Judgment: Judgment normal.        Result Review :      Common labs          3/20/2023    12:48 11/27/2023    17:37   Common Labs   Glucose 88  93    BUN 17  19    Creatinine 1.28  1.11    Sodium 141  140    Potassium 4.2  3.5    Chloride 104  104    Calcium 10.7  9.7    Albumin 4.5  4.4    Total Bilirubin 0.3  0.4    Alkaline Phosphatase 82  97    AST (SGOT) 20  25    ALT (SGPT) 22  26    WBC 8.44  11.00    Hemoglobin 15.4  15.0    Hematocrit 44.6  45.5    Platelets 165  168    Total Cholesterol 162     Triglycerides 270     HDL Cholesterol 32     LDL Cholesterol  85     PSA 1.980       Data reviewed : Consultant notes general sx              Assessment and Plan     Diagnoses and all orders for this visit:    1. Body aches (Primary)  -     POCT Influenza A/B  -     POCT SARS-CoV-2 Antigen TEJ    2. Cigarette nicotine dependence with nicotine-induced disorder  Assessment & Plan:  Tobacco use is unchanged.  Smoking cessation counseling was provided.  Tobacco use will be reassessed at the next regular appointment.                                              3. Primary hypertension  Assessment & Plan:  Hypertension is improving with treatment.  Continue current treatment regimen.  Dietary sodium restriction.  Weight loss.  Regular aerobic exercise.  Blood pressure will be reassessed at the next regular appointment.         4. Acute cough  -     POCT Influenza A/B    5. Acute non-recurrent maxillary sinusitis  Comments:  cefdinir 300mg PO bid x 7 days   prednisone 40mg PO qd x 5 days   increase rest/clear fluids    Other orders  -     cefdinir (OMNICEF) 300  MG capsule; Take 1 capsule by mouth 2 (Two) Times a Day for 7 days.  Dispense: 14 capsule; Refill: 0  -     predniSONE (DELTASONE) 20 MG tablet; Take 2 tablets by mouth Daily for 5 days.  Dispense: 10 tablet; Refill: 0             Follow Up     Return if symptoms worsen or fail to improve.  Patient was given instructions and counseling regarding his condition or for health maintenance advice. Please see specific information pulled into the AVS if appropriate.

## 2024-02-26 NOTE — ASSESSMENT & PLAN NOTE
Hypertension is improving with treatment.  Continue current treatment regimen.  Dietary sodium restriction.  Weight loss.  Regular aerobic exercise.  Blood pressure will be reassessed at the next regular appointment.

## 2024-02-26 NOTE — ASSESSMENT & PLAN NOTE
Tobacco use is unchanged.  Smoking cessation counseling was provided.  Tobacco use will be reassessed at the next regular appointment.

## 2024-02-28 ENCOUNTER — PATIENT ROUNDING (BHMG ONLY) (OUTPATIENT)
Dept: FAMILY MEDICINE CLINIC | Facility: CLINIC | Age: 64
End: 2024-02-28
Payer: MEDICARE

## 2024-05-02 RX ORDER — ATORVASTATIN CALCIUM 20 MG/1
20 TABLET, FILM COATED ORAL DAILY
Qty: 90 TABLET | Refills: 2 | Status: SHIPPED | OUTPATIENT
Start: 2024-05-02

## 2024-05-22 ENCOUNTER — TELEPHONE (OUTPATIENT)
Dept: FAMILY MEDICINE CLINIC | Facility: CLINIC | Age: 64
End: 2024-05-22
Payer: MEDICARE

## 2024-05-22 NOTE — TELEPHONE ENCOUNTER
RICARDO IS HAVING ANOTHER, EPISODE OF HIS BP GOING UP, AND FINGER SWELLING. HE WANTED TO MAKE AN APPT TO COME IN AND SEE YOU LATE IN THE AFTERNOON. I DIDN'T HAVE ANYTHING SOON.  PLEASE LET ME KNOW WHERE WE CAN GET HIM SCHEDULED THIS WEEK OR NEXT.

## 2024-05-24 ENCOUNTER — OFFICE VISIT (OUTPATIENT)
Dept: FAMILY MEDICINE CLINIC | Facility: CLINIC | Age: 64
End: 2024-05-24
Payer: MEDICARE

## 2024-05-24 VITALS
HEART RATE: 85 BPM | OXYGEN SATURATION: 95 % | DIASTOLIC BLOOD PRESSURE: 80 MMHG | BODY MASS INDEX: 26.81 KG/M2 | HEIGHT: 67 IN | TEMPERATURE: 97.5 F | SYSTOLIC BLOOD PRESSURE: 126 MMHG | WEIGHT: 170.8 LBS

## 2024-05-24 DIAGNOSIS — I10 PRIMARY HYPERTENSION: Chronic | ICD-10-CM

## 2024-05-24 DIAGNOSIS — Z87.891 SMOKING HISTORY: ICD-10-CM

## 2024-05-24 DIAGNOSIS — Z00.00 MEDICARE ANNUAL WELLNESS VISIT, SUBSEQUENT: Primary | ICD-10-CM

## 2024-05-24 DIAGNOSIS — J44.9 COPD, MILD: Chronic | ICD-10-CM

## 2024-05-24 DIAGNOSIS — E78.2 MIXED HYPERLIPIDEMIA: Chronic | ICD-10-CM

## 2024-05-24 DIAGNOSIS — F17.219 CIGARETTE NICOTINE DEPENDENCE WITH NICOTINE-INDUCED DISORDER: Chronic | ICD-10-CM

## 2024-05-24 NOTE — PROGRESS NOTES
The ABCs of the Annual Wellness Visit  Subsequent Medicare Wellness Visit    Subjective    Vijay Mathew is a 64 y.o. male who presents for a Subsequent Medicare Wellness Visit.    The following portions of the patient's history were reviewed and   updated as appropriate: allergies, current medications, past family history, past medical history, past social history, past surgical history, and problem list.    Compared to one year ago, the patient feels his physical   health is the same.    Compared to one year ago, the patient feels his mental   health is the same.    Recent Hospitalizations:  He was not admitted to the hospital during the last year.       Current Medical Providers:  Patient Care Team:  Amaya Robbins DO as PCP - General (Family Medicine)  Brad Cline MD as Consulting Physician (General Surgery)    Outpatient Medications Prior to Visit   Medication Sig Dispense Refill    APPLE CIDER VINEGAR PO Take 1 teaspoon(s) by mouth Daily.      atorvastatin (LIPITOR) 20 MG tablet TAKE 1 TABLET BY MOUTH ONCE DAILY FOR CHOLESTEROL 90 tablet 2    losartan (Cozaar) 50 MG tablet Take 1 tab daily 90 tablet 3    Multiple Vitamins-Minerals (EMERGEN-C IMMUNE PLUS PO) Take  by mouth.      valACYclovir (Valtrex) 1000 MG tablet Take 1 tablet 3 times a day for 7 days then, take 2 tablets a day for 14 days then, 1 tablet daily. 58 tablet 0    Ascorbic Acid (VITAMIN C PO) Take  by mouth. (Patient not taking: Reported on 5/24/2024)      VITAMIN D PO Take  by mouth. (Patient not taking: Reported on 5/24/2024)      ZINC CITRATE PO Take  by mouth. (Patient not taking: Reported on 5/24/2024)       No facility-administered medications prior to visit.       No opioid medication identified on active medication list. I have reviewed chart for other potential  high risk medication/s and harmful drug interactions in the elderly.        Aspirin is not on active medication list.  Aspirin use is not indicated based on review of  "current medical condition/s. Risk of harm outweighs potential benefits.  .    Patient Active Problem List   Diagnosis    Cigarette nicotine dependence with nicotine-induced disorder    Mixed hyperlipidemia    COPD, mild    Primary hypertension    Medicare annual wellness visit, subsequent     Advance Care Planning   Advance Care Planning     Advance Directive is not on file.  ACP discussion was held with the patient during this visit. Patient has an advance directive (not in EMR), copy requested.     Objective    Vitals:    24 1604   BP: 126/80   Pulse: 85   Temp: 97.5 °F (36.4 °C)   TempSrc: Temporal   SpO2: 95%   Weight: 77.5 kg (170 lb 12.8 oz)   Height: 170.2 cm (67\")     Estimated body mass index is 26.75 kg/m² as calculated from the following:    Height as of this encounter: 170.2 cm (67\").    Weight as of this encounter: 77.5 kg (170 lb 12.8 oz).    BMI is >= 25 and <30. (Overweight) The following options were offered after discussion;: nutrition counseling/recommendations      Does the patient have evidence of cognitive impairment? No          HEALTH RISK ASSESSMENT    Smoking Status:  Social History     Tobacco Use   Smoking Status Every Day    Current packs/day: 1.00    Average packs/day: 1 pack/day for 47.4 years (47.4 ttl pk-yrs)    Types: Cigarettes    Start date:     Passive exposure: Never   Smokeless Tobacco Never   Tobacco Comments    1600 3/3/22, INST PER ANESTHESIA PROTOCOL     Alcohol Consumption:  Social History     Substance and Sexual Activity   Alcohol Use Not Currently     Fall Risk Screen:    STEADI Fall Risk Assessment was completed, and patient is at LOW risk for falls.Assessment completed on:2024    Depression Screenin/26/2024     3:00 PM   PHQ-2/PHQ-9 Depression Screening   Little Interest or Pleasure in Doing Things 0-->not at all   Feeling Down, Depressed or Hopeless 0-->not at all   PHQ-9: Brief Depression Severity Measure Score 0       Health Habits and " Functional and Cognitive Screenin/24/2024     4:00 PM   Functional & Cognitive Status   Do you have difficulty preparing food and eating? No   Do you have difficulty bathing yourself, getting dressed or grooming yourself? No   Do you have difficulty using the toilet? No   Do you have difficulty moving around from place to place? No   Do you have trouble with steps or getting out of a bed or a chair? No   Current Diet Unhealthy Diet   Dental Exam Up to date   Eye Exam Up to date   Exercise (times per week) 5 times per week   Current Exercises Include Walking   Do you need help using the phone?  No   Are you deaf or do you have serious difficulty hearing?  No   Do you need help to go to places out of walking distance? No   Do you need help shopping? No   Do you need help preparing meals?  No   Do you need help with housework?  No   Do you need help with laundry? No   Do you need help taking your medications? No   Do you need help managing money? No   Do you ever drive or ride in a car without wearing a seat belt? No   Have you felt unusual stress, anger or loneliness in the last month? No   Who do you live with? Alone   If you need help, do you have trouble finding someone available to you? No   Have you been bothered in the last four weeks by sexual problems? No   Do you have difficulty concentrating, remembering or making decisions? No       Age-appropriate Screening Schedule:  Refer to the list below for future screening recommendations based on patient's age, sex and/or medical conditions. Orders for these recommended tests are listed in the plan section. The patient has been provided with a written plan.    Health Maintenance   Topic Date Due    AAA SCREEN (ONE-TIME)  Never done    LIPID PANEL  2024    RSV Vaccine - Adults (1 - 1-dose 60+ series) 2024 (Originally 2019)    Pneumococcal Vaccine 65+ (1 of 2 - PCV) 2024 (Originally 1965)    TDAP/TD VACCINES (1 - Tdap) 2024  (Originally 1/14/1978)    ZOSTER VACCINE (1 of 2) 06/07/2024 (Originally 1/14/2009)    COLORECTAL CANCER SCREENING  06/07/2024 (Originally 1960)    COVID-19 Vaccine (3 - 2023-24 season) 08/13/2024 (Originally 9/1/2023)    INFLUENZA VACCINE  08/01/2024    LUNG CANCER SCREENING  12/06/2024    ANNUAL WELLNESS VISIT  05/24/2025    BMI FOLLOWUP  05/24/2025    HEPATITIS C SCREENING  Completed                  CMS Preventative Services Quick Reference  Risk Factors Identified During Encounter  Fall Risk-High or Moderate: Discussed Fall Prevention in the home  The above risks/problems have been discussed with the patient.  Pertinent information has been shared with the patient in the After Visit Summary.  An After Visit Summary and PPPS were made available to the patient.    Follow Up:   Next Medicare Wellness visit to be scheduled in 1 year.       Additional E&M Note during same encounter follows:  Patient has multiple medical problems which are significant and separately identifiable that require additional work above and beyond the Medicare Wellness Visit.      Chief Complaint  Hypertension (Pt's bp has been running 177/107 at home after eating foods with high sodium.) and Medicare Wellness-subsequent    Subjective        His blood pressures have been fluctuating since his last visit.     Hypertension  This is a chronic problem. The current episode started more than 1 year ago. The problem has been stable since onset. Pertinent negatives include no anxiety, blurred vision, chest pain, headaches, malaise/fatigue, orthopnea, palpitations, peripheral edema or shortness of breath. Agents associated with hypertension include NSAIDs. Compliance problems include diet.      Vijay Mathew is also being seen today for HTN, HLD, nicotine dependence, COPD.     Review of Systems   Constitutional:  Negative for malaise/fatigue.   HENT:  Negative for trouble swallowing.    Eyes:  Negative for blurred vision and visual  "disturbance.   Respiratory:  Negative for apnea and shortness of breath.    Cardiovascular:  Negative for chest pain, palpitations and orthopnea.   Gastrointestinal:  Negative for blood in stool.   Endocrine: Negative for polyphagia.   Genitourinary:  Negative for dysuria.   Skin:  Negative for color change.   Allergic/Immunologic: Negative for immunocompromised state.   Neurological:  Negative for seizures.   Hematological:  Negative for adenopathy.   Psychiatric/Behavioral:  Negative for behavioral problems.        Objective   Vital Signs:  /80   Pulse 85   Temp 97.5 °F (36.4 °C) (Temporal)   Ht 170.2 cm (67\")   Wt 77.5 kg (170 lb 12.8 oz)   SpO2 95%   BMI 26.75 kg/m²     Physical Exam  Vitals reviewed.   Constitutional:       Appearance: He is well-developed and overweight.   HENT:      Head: Normocephalic and atraumatic.      Right Ear: External ear normal.      Left Ear: External ear normal.      Mouth/Throat:      Pharynx: No oropharyngeal exudate.   Eyes:      Conjunctiva/sclera: Conjunctivae normal.      Pupils: Pupils are equal, round, and reactive to light.   Neck:      Vascular: No carotid bruit.   Cardiovascular:      Rate and Rhythm: Normal rate and regular rhythm.      Heart sounds: No murmur heard.     No friction rub. No gallop.   Pulmonary:      Effort: Pulmonary effort is normal.      Breath sounds: Normal breath sounds. No wheezing or rhonchi.   Abdominal:      General: There is no distension.   Skin:     General: Skin is warm and dry.   Neurological:      Mental Status: He is alert and oriented to person, place, and time.      Cranial Nerves: No cranial nerve deficit.      Motor: No weakness.   Psychiatric:         Mood and Affect: Mood and affect normal.         Behavior: Behavior normal.         Thought Content: Thought content normal.         Judgment: Judgment normal.            CMP          11/27/2023    17:37   CMP   Glucose 93    BUN 19    Creatinine 1.11    EGFR 74.2    Sodium " 140    Potassium 3.5    Chloride 104    Calcium 9.7    Total Protein 7.5    Albumin 4.4    Globulin 3.1    Total Bilirubin 0.4    Alkaline Phosphatase 97    AST (SGOT) 25    ALT (SGPT) 26    Albumin/Globulin Ratio 1.4    BUN/Creatinine Ratio 17.1    Anion Gap 10.8      CBC          11/27/2023    17:37   CBC   WBC 11.00    RBC 5.09    Hemoglobin 15.0    Hematocrit 45.5    MCV 89.4    MCH 29.5    MCHC 33.0    RDW 13.7    Platelets 168        TSH          11/27/2023    17:37   TSH   TSH 1.380                 Assessment and Plan   Diagnoses and all orders for this visit:    1. Medicare annual wellness visit, subsequent (Primary)    2. Primary hypertension  Comments:  He was educated about the need to avoid excess sodium and stop smoking to best help better control his blood pressure.  Assessment & Plan:  Hypertension is stable and controlled  Continue current treatment regimen.  Dietary sodium restriction.  Weight loss.  Stop smoking.  Blood pressure will be reassessed in 6 months.      3. Mixed hyperlipidemia  Assessment & Plan:   Lipid abnormalities are improving with treatment    Plan:  Continue same medication/s without change.      Discussed medication dosage, use, side effects, and goals of treatment in detail.    Counseled patient on lifestyle modifications to help control hyperlipidemia.   Cholesterol lowering dietary information shared with patient.    Patient Treatment Goals:   LDL goal is under 100    Followup in 6 months.    The 10-year ASCVD risk score (Qian AMBROSE, et al., 2019) is: 21.8%    Values used to calculate the score:      Age: 64 years      Sex: Male      Is Non- : No      Diabetic: No      Tobacco smoker: Yes      Systolic Blood Pressure: 126 mmHg      Is BP treated: Yes      HDL Cholesterol: 32 mg/dL      Total Cholesterol: 162 mg/dL        4. COPD, mild  Assessment & Plan:  COPD is stable.    Plan:  Continue same medication/s without change.    Discussed medication  dosage, use, side effects, and goals of treatment in detail.    Discussed monitoring symptoms and use of quick-relief medications and maintenance medication..    Patient Treatment Goals:   symptom prevention, minimizing limitation in activity, prevention of exacerbations and use of ER/inpatient care, maintenance of optimal pulmonary function, and minimization of adverse effects of treatment    Followup in 6 months.        5. Smoking history  -     CT Chest Low Dose Wo; Future    6. Cigarette nicotine dependence with nicotine-induced disorder  Assessment & Plan:  Tobacco use is unchanged.  Smoking cessation counseling was provided.  Tobacco use will be reassessed in 6 months.         Follow Up   Return in about 6 months (around 11/24/2024).  Patient was given instructions and counseling regarding his condition or for health maintenance advice. Please see specific information pulled into the AVS if appropriate.           Answers submitted by the patient for this visit:  Primary Reason for Visit (Submitted on 5/24/2024)  What is the primary reason for your visit?: High Blood Pressure

## 2024-05-26 NOTE — ASSESSMENT & PLAN NOTE
Lipid abnormalities are improving with treatment    Plan:  Continue same medication/s without change.      Discussed medication dosage, use, side effects, and goals of treatment in detail.    Counseled patient on lifestyle modifications to help control hyperlipidemia.   Cholesterol lowering dietary information shared with patient.    Patient Treatment Goals:   LDL goal is under 100    Followup in 6 months.    The 10-year ASCVD risk score (Qian AMBROSE, et al., 2019) is: 21.8%    Values used to calculate the score:      Age: 64 years      Sex: Male      Is Non- : No      Diabetic: No      Tobacco smoker: Yes      Systolic Blood Pressure: 126 mmHg      Is BP treated: Yes      HDL Cholesterol: 32 mg/dL      Total Cholesterol: 162 mg/dL

## 2024-05-26 NOTE — ASSESSMENT & PLAN NOTE
Hypertension is stable and controlled  Continue current treatment regimen.  Dietary sodium restriction.  Weight loss.  Stop smoking.  Blood pressure will be reassessed in 6 months.

## 2024-10-25 ENCOUNTER — CLINICAL SUPPORT (OUTPATIENT)
Dept: FAMILY MEDICINE CLINIC | Facility: CLINIC | Age: 64
End: 2024-10-25
Payer: MEDICARE

## 2024-10-25 DIAGNOSIS — I49.9 CARDIAC ARRHYTHMIA, UNSPECIFIED CARDIAC ARRHYTHMIA TYPE: Primary | ICD-10-CM

## 2024-10-25 DIAGNOSIS — R00.2 PALPITATIONS: Primary | ICD-10-CM

## 2024-11-08 ENCOUNTER — OFFICE VISIT (OUTPATIENT)
Dept: CARDIOLOGY | Facility: CLINIC | Age: 64
End: 2024-11-08
Payer: MEDICARE

## 2024-11-08 VITALS
HEIGHT: 67 IN | BODY MASS INDEX: 25.93 KG/M2 | HEART RATE: 75 BPM | WEIGHT: 165.2 LBS | SYSTOLIC BLOOD PRESSURE: 151 MMHG | DIASTOLIC BLOOD PRESSURE: 84 MMHG

## 2024-11-08 DIAGNOSIS — E78.2 MIXED HYPERLIPIDEMIA: Chronic | ICD-10-CM

## 2024-11-08 DIAGNOSIS — I10 PRIMARY HYPERTENSION: Chronic | ICD-10-CM

## 2024-11-08 DIAGNOSIS — I48.92 PAROXYSMAL ATRIAL FLUTTER: Primary | ICD-10-CM

## 2024-11-08 DIAGNOSIS — F17.219 CIGARETTE NICOTINE DEPENDENCE WITH NICOTINE-INDUCED DISORDER: Chronic | ICD-10-CM

## 2024-11-08 DIAGNOSIS — Z72.0 TOBACCO USE: ICD-10-CM

## 2024-11-08 RX ORDER — CARVEDILOL 6.25 MG/1
6.25 TABLET ORAL 2 TIMES DAILY
Qty: 180 TABLET | Refills: 3 | Status: SHIPPED | OUTPATIENT
Start: 2024-11-08

## 2024-11-08 NOTE — PROGRESS NOTES
[facility]  INTERVENTIONAL CARDIOLOGY NEW PATIENT OFFICE VISIT      Chief Complaint  Irregular Heart Beat, Rapid Heart Rate, Palpitations, and Shortness of Breath    Subjective          History of Present Illness    Vijay Mathew is a 64 y.o. male who presents to [facility] for new patient visit.       History of Present Illness  The patient is a 64-year-old male who presents for evaluation of irregular heartbeat.    He reports experiencing an irregular heartbeat, which was confirmed by an EKG performed by the ambulance service in [place] three weeks ago. The irregularity is unpredictable, with his heart sometimes racing and skipping beats, while at other times it functions normally. The frequency of these palpitations varies, occurring once a day, two or three times a day, or skipping two or three days. These symptoms have been present for 2 to 3 months, with serious episodes occurring once or twice a month. He experiences an irregular heartbeat almost daily. During these episodes, his heart races, his blood pressure rises, and he experiences chest pain, shortness of breath, and a feeling of faintness. These episodes can last from 10 minutes to 2 hours. His blood pressure and heart rate normalize by the time he reaches the hospital.  He reports no history of diabetes but admits to high cholesterol and high blood pressure, for which he takes medication. His blood pressure was slightly elevated when checked today, but it does not typically reach such levels at home. He last checked his blood pressure at home 3 or 4 days ago, and it was slightly high, around 160 systolic.    Patient brought history above 3-lead EKG with one of the strips showing atrial flutter with variable AV block.        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, but [employer]- hearing aid right ear     History of kidney stones     History of rheumatic fever     NO HEART  EFFECTS    Hyperlipidemia     Hypertension     Sinus trouble     Smoker        Medical History:  Past Medical History:   Diagnosis Date    Cancer 12-22    Skin cancer    COPD (chronic obstructive pulmonary disease) 12-22    History of deafness     partial deafness in left ear, but [employer] [employer] [place]- hearing aid right ear     History of kidney stones     History of rheumatic fever     NO HEART  EFFECTS    Hyperlipidemia     Hypertension     Sinus trouble     Smoker        Surgical History:   has a past surgical history that includes Inguinal Hernia Repair (Left, 3/4/2022) and Excision Lesion (Left, 5/11/2022).     Family History:   family history includes No Known Problems in his father and mother.     Social History:   reports that he has been smoking cigarettes. He started smoking about 47 years ago. He has a 47.9 pack-year smoking history. He has never been exposed to tobacco smoke. He has never used smokeless tobacco. He reports that he does not currently use alcohol. He reports that he does not use drugs.    Allergies:   Patient has no known allergies.    Current Outpatient Medications on File Prior to Visit   Medication Sig    APPLE CIDER VINEGAR PO Take 1 teaspoon(s) by mouth Daily.    atorvastatin (LIPITOR) 20 MG tablet TAKE 1 TABLET BY MOUTH ONCE DAILY FOR CHOLESTEROL    losartan (Cozaar) 50 MG tablet Take 1 tab daily    Multiple Vitamins-Minerals (EMERGEN-C IMMUNE PLUS PO) Take  by mouth.    valACYclovir (Valtrex) 1000 MG tablet Take 1 tablet 3 times a day for 7 days then, take 2 tablets a day for 14 days then, 1 tablet daily. (Patient not taking: Reported on 11/8/2024)     No current facility-administered medications on file prior to visit.          Review of Systems   Negative ROS except as mentioned in HPI above.     Objective     /84   Pulse 75   Ht 170.2 cm (67.01\")   Wt 74.9 kg (165 lb 3.2 oz)   " BMI 25.87 kg/m²       Physical Exam  General : Alert, awake, no acute distress  Neck : Supple, no carotid bruit, no jugular venous distention  CVS : Regular rate and rhythm, no murmur, rubs or gallops  Lungs: Clear to auscultation bilaterally, no crackles or rhonchi  Abdomen: Soft, nontender, bowel sounds heard in all 4 quadrants  Extremities: Warm, well-perfused, no pedal edema      Result Review :     The following data was reviewed by: Jayesh Ovalle MD on 11/08/2024:    CMP          11/27/2023    17:37   CMP   Glucose 93    BUN 19    Creatinine 1.11    EGFR 74.2    Sodium 140    Potassium 3.5    Chloride 104    Calcium 9.7    Total Protein 7.5    Albumin 4.4    Globulin 3.1    Total Bilirubin 0.4    Alkaline Phosphatase 97    AST (SGOT) 25    ALT (SGPT) 26    Albumin/Globulin Ratio 1.4    BUN/Creatinine Ratio 17.1    Anion Gap 10.8      CBC          11/27/2023    17:37   CBC   WBC 11.00    RBC 5.09    Hemoglobin 15.0    Hematocrit 45.5    MCV 89.4    MCH 29.5    MCHC 33.0    RDW 13.7    Platelets 168      TSH          11/27/2023    17:37   TSH   TSH 1.380             Data reviewed: Cardiology studies        No results found for this or any previous visit.          Procedures  EKG from 10/25/2024 reviewed personally, shows sinus rhythm without acute ST-T changes.  Compared to prior EKG from 11/27/2023 no significant changes.    The 10-year ASCVD risk score (Qian AMBROSE, et al., 2019) is: 28.9%    Values used to calculate the score:      Age: 64 years      Sex: Male      Is Non- : No      Diabetic: No      Tobacco smoker: Yes      Systolic Blood Pressure: 151 mmHg      Is BP treated: Yes      HDL Cholesterol: 32 mg/dL      Total Cholesterol: 162 mg/dL         Assessment and Plan      Diagnoses and all orders for this visit:    1. Paroxysmal atrial flutter (Primary)  -     Holter Monitor - 72 Hour Up To 15 Days    2. Tobacco use    3. Primary hypertension  -     Holter Monitor - 72 Hour Up  To 15 Days    4. Mixed hyperlipidemia    5. Cigarette nicotine dependence with nicotine-induced disorder    Other orders  -     carvedilol (COREG) 6.25 MG tablet; Take 1 tablet by mouth 2 (Two) Times a Day.  Dispense: 180 tablet; Refill: 3  -     apixaban (Eliquis) 5 MG tablet tablet; Take 1 tablet by mouth Every 12 (Twelve) Hours.  Dispense: 120 tablet; Refill: 3        Assessment & Plan  1.   Paroxysmal atrial flutter.  The patient's symptoms and medical history suggest a diagnosis of atrial flutter. He reports experiencing irregular heartbeats, racing heart, and occasional chest pain over the past 2-3 months. The episodes are unpredictable and occur almost daily, with serious heart rate episodes happening once or twice a month. A Holter monitor will be attached today for a duration of 14 days to confirm the diagnosis. Coreg will be initiated to manage blood pressure and suppress atrial fibrillation /atrial flutter episodes. Eliquis, will be prescribed for stroke prophylaxis.  He has elevated [id number] score.  He is advised to continue his losartan regimen. The potential risks of blood thinners, including prolonged bleeding, were discussed.    2. Hypertension.  He reports that his blood pressure is slightly elevated at home, with recent readings around 160 mmHg systolic blood pressure. He is advised to continue his current medication regimen, including losartan and the newly prescribed Coreg, which will also help manage his blood pressure.    3. Hyperlipidemia.  He is currently taking medication for high cholesterol. He should continue his current cholesterol-lowering medication.  He had elevated 10-year ASCVD risk of more than 28%.    Follow-up  Return in 4 months for follow up.      Patient was educated on cardiac diet, adequate exercise and achieving/maintaining optimal weight.    Vijay Mathew  reports that he has been smoking cigarettes. He started smoking about 47 years ago. He has a 47.9 pack-year  smoking history. He has never been exposed to tobacco smoke. He has never used smokeless tobacco. I have educated him on the risk of diseases from using tobacco products such as cancer, COPD, and heart disease.       Follow Up     Return in about 4 months (around 3/8/2025) for With Kyleigh Byers.             I spent 45 minutes caring for this patient on this date of service. This time includes time spent by me in the following activities:preparing for the visit, reviewing tests, obtaining and/or reviewing a separately obtained history, performing a medically appropriate examination and/or evaluation , counseling and educating the patient/family/caregiver, ordering medications, tests, or procedures, referring and communicating with other health care professionals , documenting information in the medical record, independently interpreting results and communicating that information with the patient/family/caregiver, and care coordination.     The patient was seen and examined. Work by the provider also included review and/or ordering of lab tests, review and/or ordering of radiology tests, review and/or ordering of medicine tests, discussion with other physicians or providers, independent review of data, obtaining old records, review/summation of old records, and/or other review.    I have reviewed the family history, social history, and past medical history for this patient. Previous information and data has been reviewed and updated as needed. I have reviewed and verified the chief complaint, history, and other documentation. The patient was interviewed and examined in the clinic and the chart reviewed. The previous observations, recommendations, and conclusions were reviewed including those of other providers.     The plan was discussed with the patient and/or family. The patient was given time to ask questions and these questions were answered. At the conclusion of their visit they had no additional  questions or concerns and all questions were answered to their satisfaction.     Patient was given instructions and counseling regarding her condition or for health maintenance advice. Please see specific information pulled into the AVS if appropriate.      Patient or patient representative verbalized consent for the use of Ambient Listening during the visit with  Jayesh Ovalle MD for chart documentation. 11/8/2024  13:52 EST      Jayesh Ovalle MD, [facility]  11/08/24  13:51 EST

## 2024-11-08 NOTE — LETTER
November 8, 2024     Amaya Robbins DO  [address]  [address]  [facility] [address]    Patient: Vijay Mathew   YOB: 1960   Date of Visit: 11/8/2024       Dear Amaya Robbins DO,    Vijay Mathew was in my office today. Below are the relevant portions of my assessment and plan of care.           If you have questions, please do not hesitate to call me. I look forward to following Vijay along with you.         Sincerely,        Jayesh Ovalle MD        CC: Brad Cline MD

## 2024-11-25 ENCOUNTER — OFFICE VISIT (OUTPATIENT)
Dept: FAMILY MEDICINE CLINIC | Facility: CLINIC | Age: 64
End: 2024-11-25
Payer: MEDICARE

## 2024-11-25 VITALS
HEART RATE: 65 BPM | SYSTOLIC BLOOD PRESSURE: 118 MMHG | TEMPERATURE: 98.2 F | HEIGHT: 67 IN | WEIGHT: 165.3 LBS | OXYGEN SATURATION: 97 % | DIASTOLIC BLOOD PRESSURE: 61 MMHG | RESPIRATION RATE: 16 BRPM | BODY MASS INDEX: 25.94 KG/M2

## 2024-11-25 DIAGNOSIS — I48.92 PAROXYSMAL ATRIAL FLUTTER: Chronic | ICD-10-CM

## 2024-11-25 DIAGNOSIS — I10 PRIMARY HYPERTENSION: Primary | Chronic | ICD-10-CM

## 2024-11-25 DIAGNOSIS — E78.2 MIXED HYPERLIPIDEMIA: Chronic | ICD-10-CM

## 2024-11-25 DIAGNOSIS — Z12.5 SCREENING FOR PROSTATE CANCER: ICD-10-CM

## 2024-11-25 DIAGNOSIS — F17.219 CIGARETTE NICOTINE DEPENDENCE WITH NICOTINE-INDUCED DISORDER: Chronic | ICD-10-CM

## 2024-11-25 DIAGNOSIS — Z00.00 ANNUAL PHYSICAL EXAM: ICD-10-CM

## 2024-11-25 DIAGNOSIS — Z12.11 COLON CANCER SCREENING: ICD-10-CM

## 2024-11-25 PROBLEM — Z72.0 TOBACCO USE: Status: RESOLVED | Noted: 2024-11-08 | Resolved: 2024-11-25

## 2024-11-25 PROCEDURE — 1160F RVW MEDS BY RX/DR IN RCRD: CPT | Performed by: FAMILY MEDICINE

## 2024-11-25 PROCEDURE — 1159F MED LIST DOCD IN RCRD: CPT | Performed by: FAMILY MEDICINE

## 2024-11-25 PROCEDURE — 99214 OFFICE O/P EST MOD 30 MIN: CPT | Performed by: FAMILY MEDICINE

## 2024-11-25 PROCEDURE — 3078F DIAST BP <80 MM HG: CPT | Performed by: FAMILY MEDICINE

## 2024-11-25 PROCEDURE — G2211 COMPLEX E/M VISIT ADD ON: HCPCS | Performed by: FAMILY MEDICINE

## 2024-11-25 PROCEDURE — 3074F SYST BP LT 130 MM HG: CPT | Performed by: FAMILY MEDICINE

## 2024-11-25 PROCEDURE — 1126F AMNT PAIN NOTED NONE PRSNT: CPT | Performed by: FAMILY MEDICINE

## 2024-11-25 NOTE — PROGRESS NOTES
"Chief Complaint  Hypertension    Subjective        Vijay Scott Mathew presents to [facility]  History of Present Illness  He is here today for management of his chronic medical conditoins. He has COPD, HTN and tobacco abuse. He does not have a past family history of cancer. He has smoked for over 46 years one pack per day.      His home blood pressure is running 135/85.      He is trying to cut back smoking.      He has been having bouts of racing heart beat. He went to the ambulance service and an EKG strip showed atrial flutter. He was recently seen by cardiology and started on Coreg and Eliquis.  He is tolerated the medication without issue.     The patient has no other complaints today and denies chest pain, shortness of breath, weakness, numbness, nausea, vomiting, diarrhea, dizziness or syncopal event.        Objective   Vital Signs:  /61   Pulse 65   Temp 98.2 °F (36.8 °C)   Resp 16   Ht 170.2 cm (67.01\")   Wt 75 kg (165 lb 4.8 oz)   SpO2 97%   BMI 25.88 kg/m²   Estimated body mass index is 25.88 kg/m² as calculated from the following:    Height as of this encounter: 170.2 cm (67.01\").    Weight as of this encounter: 75 kg (165 lb 4.8 oz).            Physical Exam  Vitals reviewed.   Constitutional:       Appearance: He is well-developed and overweight.   HENT:      Head: Normocephalic and atraumatic.      Right Ear: External ear normal.      Left Ear: External ear normal.      Mouth/Throat:      Pharynx: No oropharyngeal exudate.   Eyes:      Conjunctiva/sclera: Conjunctivae normal.      Pupils: Pupils are equal, round, and reactive to light.   Neck:      Vascular: No carotid bruit.   Cardiovascular:      Rate and Rhythm: Normal rate and regular rhythm.      Heart sounds: No murmur heard.     No friction rub. No gallop.   Pulmonary:      Effort: Pulmonary effort is normal.      Breath sounds: Normal breath sounds. No wheezing or rhonchi.   Abdominal:      General: " There is no distension.   Skin:     General: Skin is warm and dry.   Neurological:      Mental Status: He is alert and oriented to person, place, and time.      Cranial Nerves: No cranial nerve deficit.      Motor: No weakness.   Psychiatric:         Mood and Affect: Mood and affect normal.         Behavior: Behavior normal.         Thought Content: Thought content normal.         Judgment: Judgment normal.        Result Review :                      Assessment and Plan   Diagnoses and all orders for this visit:    1. Primary hypertension (Primary)  Assessment & Plan:  Hypertension is stable and controlled  Continue current treatment regimen.  Dietary sodium restriction.  Weight loss.  Stop smoking.  Blood pressure will be reassessed in 6 months.      2. Annual physical exam  -     TSH+Free T4; Future  -     CBC & Differential; Future  -     Comprehensive Metabolic Panel; Future  -     Urinalysis With Microscopic - Urine, Clean Catch; Future    3. Colon cancer screening  -     Cologuard - Stool, Per Rectum; Future    4. Mixed hyperlipidemia  Assessment & Plan:   Lipid abnormalities are improving with treatment    Plan:  Continue same medication/s without change.      Discussed medication dosage, use, side effects, and goals of treatment in detail.    Counseled patient on lifestyle modifications to help control hyperlipidemia.   Cholesterol lowering dietary information shared with patient.    Patient Treatment Goals:   LDL goal is under 100    Followup in 6 months.    The 10-year ASCVD risk score (Qian DK, et al., 2019) is: 19.6%    Values used to calculate the score:      Age: 64 years      Sex: Male      Is Non- : No      Diabetic: No      Tobacco smoker: Yes      Systolic Blood Pressure: 118 mmHg      Is BP treated: Yes      HDL Cholesterol: 32 mg/dL      Total Cholesterol: 162 mg/dL      Orders:  -     Lipid Panel; Future    5. Screening for prostate cancer  -     PSA Screen;  Future    6. Paroxysmal atrial flutter  Assessment & Plan:  The patient's had improvement in his racing heartbeats with Coreg 6.25 mg twice daily.  He is also on Eliquis 5 mg twice daily.      7. Cigarette nicotine dependence with nicotine-induced disorder  Assessment & Plan:  Tobacco use is unchanged.  Smoking cessation counseling was provided.  Tobacco use will be reassessed in 6 months.                                                     Follow Up   Return in about 6 months (around 5/25/2025).  Patient was given instructions and counseling regarding his condition or for health maintenance advice. Please see specific information pulled into the AVS if appropriate.

## 2024-11-25 NOTE — ASSESSMENT & PLAN NOTE
The patient's had improvement in his racing heartbeats with Coreg 6.25 mg twice daily.  He is also on Eliquis 5 mg twice daily.

## 2024-11-25 NOTE — ASSESSMENT & PLAN NOTE
Lipid abnormalities are improving with treatment    Plan:  Continue same medication/s without change.      Discussed medication dosage, use, side effects, and goals of treatment in detail.    Counseled patient on lifestyle modifications to help control hyperlipidemia.   Cholesterol lowering dietary information shared with patient.    Patient Treatment Goals:   LDL goal is under 100    Followup in 6 months.    The 10-year ASCVD risk score (Qian AMBROSE, et al., 2019) is: 19.6%    Values used to calculate the score:      Age: 64 years      Sex: Male      Is Non- : No      Diabetic: No      Tobacco smoker: Yes      Systolic Blood Pressure: 118 mmHg      Is BP treated: Yes      HDL Cholesterol: 32 mg/dL      Total Cholesterol: 162 mg/dL

## 2024-12-06 DIAGNOSIS — R19.5 POSITIVE COLORECTAL CANCER SCREENING USING COLOGUARD TEST: Primary | ICD-10-CM

## 2024-12-20 ENCOUNTER — HOSPITAL ENCOUNTER (OUTPATIENT)
Dept: CT IMAGING | Facility: HOSPITAL | Age: 64
Discharge: HOME | End: 2024-12-20
Payer: MEDICARE

## 2024-12-20 DIAGNOSIS — Z87.891 SMOKING HISTORY: ICD-10-CM

## 2024-12-20 PROCEDURE — 71271 CT THORAX LUNG CANCER SCR C-: CPT

## 2025-01-15 RX ORDER — ATORVASTATIN CALCIUM 20 MG/1
20 TABLET, FILM COATED ORAL DAILY
Qty: 90 TABLET | Refills: 1 | Status: SHIPPED | OUTPATIENT
Start: 2025-01-15

## 2025-01-15 RX ORDER — LOSARTAN POTASSIUM 50 MG/1
TABLET ORAL
Qty: 90 TABLET | Refills: 1 | Status: SHIPPED | OUTPATIENT
Start: 2025-01-15

## 2025-03-07 ENCOUNTER — OFFICE VISIT (OUTPATIENT)
Dept: CARDIOLOGY | Facility: CLINIC | Age: 65
End: 2025-03-07
Payer: MEDICARE

## 2025-03-07 VITALS
OXYGEN SATURATION: 97 % | WEIGHT: 171.2 LBS | HEART RATE: 71 BPM | SYSTOLIC BLOOD PRESSURE: 138 MMHG | HEIGHT: 67 IN | DIASTOLIC BLOOD PRESSURE: 76 MMHG | BODY MASS INDEX: 26.87 KG/M2

## 2025-03-07 DIAGNOSIS — F17.219 CIGARETTE NICOTINE DEPENDENCE WITH NICOTINE-INDUCED DISORDER: ICD-10-CM

## 2025-03-07 DIAGNOSIS — Z72.0 TOBACCO USE: ICD-10-CM

## 2025-03-07 DIAGNOSIS — E78.2 MIXED HYPERLIPIDEMIA: ICD-10-CM

## 2025-03-07 DIAGNOSIS — I10 PRIMARY HYPERTENSION: Primary | ICD-10-CM

## 2025-03-07 DIAGNOSIS — I48.92 PAROXYSMAL ATRIAL FLUTTER: ICD-10-CM

## 2025-03-07 DIAGNOSIS — J44.9 COPD, MILD: Chronic | ICD-10-CM

## 2025-03-07 NOTE — PROGRESS NOTES
[facility]  INTERVENTIONAL CARDIOLOGY FOLLOW-UP PROGRESS NOTE        Chief Complaint  Paroxysmal atrial flutter (4 month)    Subjective            History of Present Illness  Vijay Mathew is a 65 y.o. male who presents to [facility] CARDIOLOGY    History of Present Illness  The patient presents for evaluation of atrial fibrillation/flutter, hypertension, and smoking cessation.    He reports no recent episodes of atrial fibrillation, attributing his current stable condition to the prescribed medication regimen. He experiences occasional skipped heartbeats but overall feels well.  He was previously diagnosed with atrial flutter as per the EMS EKG tracing.  He is currently on Eliquis 5 mg twice daily, atorvastatin 20 mg daily, Coreg 6.25 mg twice daily and losartan 50 mg daily.  His repeat blood pressure was 138/76 with heart rate of 71.    He mentions that his blood pressure was slightly elevated today, which he attributes to rushing to get here. He believes his blood pressure decreased after walking. He is currently on losartan 50 mg and Coreg 6.25 mg twice daily.    He continues to smoke approximately one pack of cigarettes daily. He has attempted to quit using nicotine patches and gums but found them ineffective. He has been smoking since the age of 17.    Supplemental Information  He had influenza and has mucus in his lungs. He also reports persistent nasal and chest congestion. He is currently on atorvastatin.    SOCIAL HISTORY  He smokes about a pack a day.    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     Family History:   family history includes No Known Problems in his father and mother.     Social History:   reports that he has been smoking cigarettes. He started smoking about 48 years ago. He has a 48.2 pack-year smoking history. He has never been exposed to tobacco smoke. He has never used smokeless tobacco. He reports that he does not currently use alcohol. He reports that he does not use drugs.    Allergies:   Patient has no known allergies.    Current Outpatient Medications on File Prior to Visit   Medication Sig    apixaban (Eliquis) 5 MG tablet tablet Take 1 tablet by mouth Every 12 (Twelve) Hours.    atorvastatin (LIPITOR) 20 MG tablet TAKE 1 TABLET BY MOUTH ONCE DAILY FOR CHOLESTEROL    carvedilol (COREG) 6.25 MG tablet Take 1 tablet by mouth 2 (Two) Times a Day.    losartan (COZAAR) 50 MG tablet TAKE 1 TABLET BY MOUTH ONCE DAILY FOR BLOOD PRESSURE    [DISCONTINUED] apixaban (ELIQUIS) 5 MG tablet tablet Take 1 tablet by mouth 2 (Two) Times a Day. (Patient not taking: Reported on 3/7/2025)     No current facility-administered medications on file prior to visit.          Review of Systems   Negative except as mentioned in HPI above.    Objective     /76   Pulse 71   Ht 170.2 cm (67.01\")   Wt 77.7 kg (171 lb 3.2 oz)   SpO2 97%   BMI 26.81 kg/m²       Physical Exam    General : Alert, awake, no acute distress  Neck : Supple, no carotid bruit, no jugular " venous distention  CVS : Regular rate and rhythm, no murmur, rubs or gallops  Lungs: Clear to auscultation bilaterally, no crackles or rhonchi  Abdomen: Soft, nontender, bowel sounds heard in all 4 quadrants  Extremities: Warm, well-perfused, no pedal edema    Result Review :     The following data was reviewed by: Jayesh Ovalle MD on 03/07/2025:                   Data reviewed: Cardiology studies        Procedures  Previous EKG shows sinus rhythm.        ASCVD Score  The 10-year ASCVD risk score (Qian AMBROSE, et al., 2019) is: 26.2%    Values used to calculate the score:      Age: 65 years      Sex: Male      Is Non- : No      Diabetic: No      Tobacco smoker: Yes      Systolic Blood Pressure: 138 mmHg      Is BP treated: Yes      HDL Cholesterol: 32 mg/dL      Total Cholesterol: 162 mg/dL         Assessment and Plan          Diagnoses and all orders for this visit:    1. Primary hypertension (Primary)    2. Mixed hyperlipidemia    3. Paroxysmal atrial flutter    4. Tobacco use    5. Cigarette nicotine dependence with nicotine-induced disorder    6. COPD, mild          Assessment & Plan  1.  Paroxysmal Atrial Fibrillation/flutter  The Holter monitor results did not indicate any episodes of atrial fibrillation.  He was on beta-blocker during the time of cardiac monitor.  He reports feeling good with no palpitations, although he occasionally experiences a skipped heartbeat. He is currently taking Eliquis and attempts to maintain a 12-hour interval between doses, though sometimes it varies due to his work schedule. He is advised to continue his current medication regimen, including Eliquis, and to ensure a 12-hour interval between doses when possible.    2. Hypertension.  He reports that his blood pressure was slightly elevated today due to rushing to the appointment. He is currently taking losartan 50 mg and Coreg 6.25 mg twice daily. His blood pressure will be rechecked during this visit to  ensure it is within the normal range.    3. Smoking Cessation.  He smokes about a pack a day and has tried nicotine patches and gum without success. He is advised to reduce his smoking to half a pack per day as an initial step towards complete cessation. If he continues to smoke by the next visit, Chantix will be considered as a potential treatment option.    Follow-up  The patient will follow up in 6 months.        Patient was educated on heart healthy diet, daily exercise and achieving optimal weight.     Follow Up     Return in about 6 months (around 9/7/2025) for Next scheduled follow up, With Kyleigh Byers.      Vijay Mathew  reports that he has been smoking cigarettes. He started smoking about 48 years ago. He has a 48.2 pack-year smoking history. He has never been exposed to tobacco smoke. He has never used smokeless tobacco. I have educated him on the risk of diseases from using tobacco products such as cancer, COPD, and heart disease.     I advised him to quit and he is willing to quit. We have discussed the following method/s for tobacco cessation:  Counseling.  Together we have set a quit date for 1 week from today.  He will follow up with me in 6 months or sooner to check on his progress.    I spent 3  minutes counseling the patient.               I spent 30 minutes caring for this patient on this date of service. This time includes time spent by me in the following activities:preparing for the visit, reviewing tests, obtaining and/or reviewing a separately obtained history, performing a medically appropriate examination and/or evaluation , counseling and educating the patient/family/caregiver, ordering medications, tests, or procedures, referring and communicating with other health care professionals , documenting information in the medical record, independently interpreting results and communicating that information with the patient/family/caregiver, and care coordination.    I have reviewed  the family history, social history, and past medical history for this patient. Previous information and data has been reviewed and updated as needed. I have reviewed and verified the chief complaint, history, and other documentation. The patient was interviewed and examined in the clinic and the chart reviewed. The previous observations, recommendations, and conclusions were reviewed including those of other providers.     The plan was discussed with the patient and/or family. The patient was given time to ask questions and these questions were answered. At the conclusion of their visit they had no additional questions or concerns and all questions were answered to their satisfaction.     Patient was given instructions and counseling regarding her condition or for health maintenance advice. Please see specific information pulled into the AVS if appropriate.      Patient or patient representative verbalized consent for the use of Ambient Listening during the visit with  Jayesh Ovalle MD for chart documentation. 3/7/2025  16:13 EST      Jayesh Ovalle MD, [facility]  03/07/25  15:28 EST    Dictated Utilizing Dragon Dictation

## 2025-03-24 ENCOUNTER — LAB (OUTPATIENT)
Dept: LAB | Facility: HOSPITAL | Age: 65
End: 2025-03-24
Payer: MEDICARE

## 2025-03-24 DIAGNOSIS — Z00.00 ANNUAL PHYSICAL EXAM: ICD-10-CM

## 2025-03-24 DIAGNOSIS — Z12.5 SCREENING FOR PROSTATE CANCER: ICD-10-CM

## 2025-03-24 DIAGNOSIS — E78.2 MIXED HYPERLIPIDEMIA: Chronic | ICD-10-CM

## 2025-03-24 LAB
ALBUMIN SERPL-MCNC: 4.2 G/DL (ref 3.5–5.2)
ALBUMIN/GLOB SERPL: 1.6 G/DL
ALP SERPL-CCNC: 86 U/L (ref 39–117)
ALT SERPL W P-5'-P-CCNC: 21 U/L (ref 1–41)
ANION GAP SERPL CALCULATED.3IONS-SCNC: 8.5 MMOL/L (ref 5–15)
AST SERPL-CCNC: 27 U/L (ref 1–40)
BACTERIA UR QL AUTO: ABNORMAL /HPF
BASOPHILS # BLD AUTO: 0.04 10*3/MM3 (ref 0–0.2)
BASOPHILS NFR BLD AUTO: 0.5 % (ref 0–1.5)
BILIRUB SERPL-MCNC: 0.4 MG/DL (ref 0–1.2)
BILIRUB UR QL STRIP: NEGATIVE
BUN SERPL-MCNC: 14 MG/DL (ref 8–23)
BUN/CREAT SERPL: 13.9 (ref 7–25)
CALCIUM SPEC-SCNC: 9.4 MG/DL (ref 8.6–10.5)
CHLORIDE SERPL-SCNC: 105 MMOL/L (ref 98–107)
CHOLEST SERPL-MCNC: 145 MG/DL (ref 0–200)
CLARITY UR: CLEAR
CO2 SERPL-SCNC: 25.5 MMOL/L (ref 22–29)
COLOR UR: YELLOW
CREAT SERPL-MCNC: 1.01 MG/DL (ref 0.76–1.27)
DEPRECATED RDW RBC AUTO: 45.9 FL (ref 37–54)
EGFRCR SERPLBLD CKD-EPI 2021: 82 ML/MIN/1.73
EOSINOPHIL # BLD AUTO: 0.1 10*3/MM3 (ref 0–0.4)
EOSINOPHIL NFR BLD AUTO: 1.2 % (ref 0.3–6.2)
ERYTHROCYTE [DISTWIDTH] IN BLOOD BY AUTOMATED COUNT: 13.5 % (ref 12.3–15.4)
GLOBULIN UR ELPH-MCNC: 2.7 GM/DL
GLUCOSE SERPL-MCNC: 82 MG/DL (ref 65–99)
GLUCOSE UR STRIP-MCNC: NEGATIVE MG/DL
HCT VFR BLD AUTO: 45.2 % (ref 37.5–51)
HDLC SERPL-MCNC: 35 MG/DL (ref 40–60)
HGB BLD-MCNC: 14.9 G/DL (ref 13–17.7)
HGB UR QL STRIP.AUTO: ABNORMAL
HYALINE CASTS UR QL AUTO: ABNORMAL /LPF
IMM GRANULOCYTES # BLD AUTO: 0.02 10*3/MM3 (ref 0–0.05)
IMM GRANULOCYTES NFR BLD AUTO: 0.2 % (ref 0–0.5)
KETONES UR QL STRIP: NEGATIVE
LDLC SERPL CALC-MCNC: 88 MG/DL (ref 0–100)
LDLC/HDLC SERPL: 2.45 {RATIO}
LEUKOCYTE ESTERASE UR QL STRIP.AUTO: NEGATIVE
LYMPHOCYTES # BLD AUTO: 2.27 10*3/MM3 (ref 0.7–3.1)
LYMPHOCYTES NFR BLD AUTO: 27.6 % (ref 19.6–45.3)
MCH RBC QN AUTO: 30.7 PG (ref 26.6–33)
MCHC RBC AUTO-ENTMCNC: 33 G/DL (ref 31.5–35.7)
MCV RBC AUTO: 93.2 FL (ref 79–97)
MONOCYTES # BLD AUTO: 0.69 10*3/MM3 (ref 0.1–0.9)
MONOCYTES NFR BLD AUTO: 8.4 % (ref 5–12)
NEUTROPHILS NFR BLD AUTO: 5.11 10*3/MM3 (ref 1.7–7)
NEUTROPHILS NFR BLD AUTO: 62.1 % (ref 42.7–76)
NITRITE UR QL STRIP: NEGATIVE
NRBC BLD AUTO-RTO: 0 /100 WBC (ref 0–0.2)
PH UR STRIP.AUTO: 6.5 [PH] (ref 5–8)
PLATELET # BLD AUTO: 158 10*3/MM3 (ref 140–450)
PMV BLD AUTO: 12.9 FL (ref 6–12)
POTASSIUM SERPL-SCNC: 3.7 MMOL/L (ref 3.5–5.2)
PROT SERPL-MCNC: 6.9 G/DL (ref 6–8.5)
PROT UR QL STRIP: NEGATIVE
PSA SERPL-MCNC: 1.31 NG/ML (ref 0–4)
RBC # BLD AUTO: 4.85 10*6/MM3 (ref 4.14–5.8)
RBC # UR STRIP: ABNORMAL /HPF
REF LAB TEST METHOD: ABNORMAL
SODIUM SERPL-SCNC: 139 MMOL/L (ref 136–145)
SP GR UR STRIP: 1.02 (ref 1–1.03)
SQUAMOUS #/AREA URNS HPF: ABNORMAL /HPF
T4 FREE SERPL-MCNC: 1.36 NG/DL (ref 0.92–1.68)
TRIGL SERPL-MCNC: 122 MG/DL (ref 0–150)
TSH SERPL DL<=0.05 MIU/L-ACNC: 1.28 UIU/ML (ref 0.27–4.2)
UROBILINOGEN UR QL STRIP: ABNORMAL
VLDLC SERPL-MCNC: 22 MG/DL (ref 5–40)
WBC # UR STRIP: ABNORMAL /HPF
WBC NRBC COR # BLD AUTO: 8.23 10*3/MM3 (ref 3.4–10.8)

## 2025-03-24 PROCEDURE — 80061 LIPID PANEL: CPT

## 2025-03-24 PROCEDURE — 81001 URINALYSIS AUTO W/SCOPE: CPT

## 2025-03-24 PROCEDURE — 84439 ASSAY OF FREE THYROXINE: CPT

## 2025-03-24 PROCEDURE — 85025 COMPLETE CBC W/AUTO DIFF WBC: CPT

## 2025-03-24 PROCEDURE — 36415 COLL VENOUS BLD VENIPUNCTURE: CPT

## 2025-03-24 PROCEDURE — G0103 PSA SCREENING: HCPCS

## 2025-03-24 PROCEDURE — 84443 ASSAY THYROID STIM HORMONE: CPT

## 2025-03-24 PROCEDURE — 80053 COMPREHEN METABOLIC PANEL: CPT

## 2025-03-27 RX ORDER — TRAMADOL HYDROCHLORIDE 50 MG/1
50 TABLET ORAL EVERY 6 HOURS PRN
Qty: 12 TABLET | Refills: 0 | Status: SHIPPED | OUTPATIENT
Start: 2025-03-27

## 2025-03-27 RX ORDER — NAPROXEN 500 MG/1
500 TABLET ORAL 2 TIMES DAILY WITH MEALS
Qty: 60 TABLET | Refills: 2 | Status: SHIPPED | OUTPATIENT
Start: 2025-03-27

## 2025-03-27 RX ORDER — CYCLOBENZAPRINE HCL 5 MG
TABLET ORAL
Qty: 90 TABLET | Refills: 1 | Status: SHIPPED | OUTPATIENT
Start: 2025-03-27

## 2025-03-30 DIAGNOSIS — R31.9 HEMATURIA, UNSPECIFIED TYPE: Primary | ICD-10-CM

## 2025-04-04 ENCOUNTER — LAB (OUTPATIENT)
Dept: LAB | Facility: HOSPITAL | Age: 65
End: 2025-04-04
Payer: MEDICARE

## 2025-04-04 DIAGNOSIS — R31.9 HEMATURIA, UNSPECIFIED TYPE: ICD-10-CM

## 2025-04-04 LAB
BACTERIA UR QL AUTO: NORMAL /HPF
BILIRUB UR QL STRIP: NEGATIVE
CLARITY UR: CLEAR
COLOR UR: YELLOW
GLUCOSE UR STRIP-MCNC: NEGATIVE MG/DL
HGB UR QL STRIP.AUTO: NEGATIVE
HYALINE CASTS UR QL AUTO: NORMAL /LPF
KETONES UR QL STRIP: NEGATIVE
LEUKOCYTE ESTERASE UR QL STRIP.AUTO: NEGATIVE
NITRITE UR QL STRIP: NEGATIVE
PH UR STRIP.AUTO: 6 [PH] (ref 5–8)
PROT UR QL STRIP: NEGATIVE
RBC # UR STRIP: NORMAL /HPF
REF LAB TEST METHOD: NORMAL
SP GR UR STRIP: 1.01 (ref 1–1.03)
SQUAMOUS #/AREA URNS HPF: NORMAL /HPF
UROBILINOGEN UR QL STRIP: NORMAL
WBC # UR STRIP: NORMAL /HPF

## 2025-04-04 PROCEDURE — 87086 URINE CULTURE/COLONY COUNT: CPT

## 2025-04-04 PROCEDURE — 81001 URINALYSIS AUTO W/SCOPE: CPT

## 2025-04-06 LAB — BACTERIA SPEC AEROBE CULT: NO GROWTH

## 2025-04-22 ENCOUNTER — HOSPITAL ENCOUNTER (OUTPATIENT)
Dept: GENERAL RADIOLOGY | Facility: HOSPITAL | Age: 65
Discharge: HOME | End: 2025-04-22
Admitting: FAMILY MEDICINE
Payer: MEDICARE

## 2025-04-22 DIAGNOSIS — M25.551 PAIN OF RIGHT HIP: Primary | ICD-10-CM

## 2025-04-22 DIAGNOSIS — M25.551 PAIN OF RIGHT HIP: ICD-10-CM

## 2025-04-22 PROCEDURE — 73502 X-RAY EXAM HIP UNI 2-3 VIEWS: CPT

## 2025-05-27 ENCOUNTER — OFFICE VISIT (OUTPATIENT)
Dept: FAMILY MEDICINE CLINIC | Facility: CLINIC | Age: 65
End: 2025-05-27
Payer: MEDICARE

## 2025-05-27 VITALS
OXYGEN SATURATION: 98 % | SYSTOLIC BLOOD PRESSURE: 152 MMHG | RESPIRATION RATE: 17 BRPM | BODY MASS INDEX: 26.21 KG/M2 | TEMPERATURE: 98.4 F | DIASTOLIC BLOOD PRESSURE: 75 MMHG | HEIGHT: 67 IN | WEIGHT: 167 LBS | HEART RATE: 73 BPM

## 2025-05-27 DIAGNOSIS — J44.9 COPD, MILD: Chronic | ICD-10-CM

## 2025-05-27 DIAGNOSIS — E78.2 MIXED HYPERLIPIDEMIA: Chronic | ICD-10-CM

## 2025-05-27 DIAGNOSIS — Z00.00 MEDICARE ANNUAL WELLNESS VISIT, SUBSEQUENT: Primary | ICD-10-CM

## 2025-05-27 DIAGNOSIS — I10 PRIMARY HYPERTENSION: Chronic | ICD-10-CM

## 2025-05-27 DIAGNOSIS — F17.219 CIGARETTE NICOTINE DEPENDENCE WITH NICOTINE-INDUCED DISORDER: Chronic | ICD-10-CM

## 2025-05-27 NOTE — PROGRESS NOTES
Subjective   The ABCs of the Annual Wellness Visit  Medicare Wellness Visit      Vijay Mathew is a 65 y.o. patient who presents for a Medicare Wellness Visit.    The following portions of the patient's history were reviewed and   updated as appropriate: allergies, current medications, past family history, past medical history, past social history, past surgical history, and problem list.    Compared to one year ago, the patient's physical   health is the same.  Compared to one year ago, the patient's mental   health is the same.    Recent Hospitalizations:  He was not admitted to the hospital during the last year.     Current Medical Providers:  Patient Care Team:  Amaya Robbins DO as PCP - General (Family Medicine)  Brad Cline MD as Consulting Physician (General Surgery)    Outpatient Medications Prior to Visit   Medication Sig Dispense Refill    apixaban (Eliquis) 5 MG tablet tablet Take 1 tablet by mouth Every 12 (Twelve) Hours. 120 tablet 3    atorvastatin (LIPITOR) 20 MG tablet TAKE 1 TABLET BY MOUTH ONCE DAILY FOR CHOLESTEROL 90 tablet 1    carvedilol (COREG) 6.25 MG tablet Take 1 tablet by mouth 2 (Two) Times a Day. 180 tablet 3    losartan (COZAAR) 50 MG tablet TAKE 1 TABLET BY MOUTH ONCE DAILY FOR BLOOD PRESSURE 90 tablet 1    cyclobenzaprine (FLEXERIL) 5 MG tablet Take 1 to 2 tablets p.o. 3 times daily as needed for muscle spasms and backache. (Patient not taking: Reported on 5/27/2025) 90 tablet 1    naproxen (Naprosyn) 500 MG tablet Take 1 tablet by mouth 2 (Two) Times a Day With Meals. (Patient not taking: Reported on 5/27/2025) 60 tablet 2    traMADol (ULTRAM) 50 MG tablet Take 1 tablet by mouth Every 6 (Six) Hours As Needed for Moderate Pain. (Patient not taking: Reported on 5/27/2025) 12 tablet 0     No facility-administered medications prior to visit.     No opioid medication identified on active medication list. I have reviewed chart for other potential  high risk medication/s and  "harmful drug interactions in the elderly.      Aspirin is not on active medication list.  Aspirin use is not indicated based on review of current medical condition/s. Risk of harm outweighs potential benefits.  .    Patient Active Problem List   Diagnosis    Cigarette nicotine dependence with nicotine-induced disorder    Mixed hyperlipidemia    COPD, mild    Primary hypertension    Medicare annual wellness visit, subsequent    Paroxysmal atrial flutter     Advance Care Planning Advance Directive is not on file.  ACP discussion was held with the patient during this visit. Patient has an advance directive (not in EMR), copy requested. Patient does not have an advance directive, information provided.            Objective   Vitals:    05/27/25 1506 05/27/25 1509   BP: 158/70 152/75   BP Location: Left arm Right arm   Patient Position: Sitting Sitting   Cuff Size: Adult Adult   Pulse: 73    Resp: 17    Temp: 98.4 °F (36.9 °C)    TempSrc: Temporal    SpO2: 98%    Weight: 75.8 kg (167 lb)    Height: 170.2 cm (67.01\")    PainSc: 0-No pain        Estimated body mass index is 26.15 kg/m² as calculated from the following:    Height as of this encounter: 170.2 cm (67.01\").    Weight as of this encounter: 75.8 kg (167 lb).    BMI is >= 25 and <30. (Overweight) The following options were offered after discussion;: nutrition counseling/recommendations           Does the patient have evidence of cognitive impairment? No  Lab Results   Component Value Date    TRIG 122 03/24/2025    HDL 35 (L) 03/24/2025    LDL 88 03/24/2025    VLDL 22 03/24/2025                                                                                                Health  Risk Assessment    Smoking Status:  Social History     Tobacco Use   Smoking Status Every Day    Current packs/day: 1.00    Average packs/day: 1 pack/day for 48.4 years (48.4 ttl pk-yrs)    Types: Cigarettes    Start date: 1/1/1977    Passive exposure: Never   Smokeless Tobacco Never "   Tobacco Comments    1600 3/3/22, INST PER ANESTHESIA PROTOCOL     Alcohol Consumption:  Social History     Substance and Sexual Activity   Alcohol Use Not Currently       Fall Risk Screen  STEADI Fall Risk Assessment was completed, and patient is at LOW risk for falls.Assessment completed on:2025    Depression Screening   Little interest or pleasure in doing things? Not at all   Feeling down, depressed, or hopeless? Not at all   PHQ-2 Total Score 0      Health Habits and Functional and Cognitive Screenin/27/2025     3:00 PM   Functional & Cognitive Status   Do you have difficulty preparing food and eating? No   Do you have difficulty bathing yourself, getting dressed or grooming yourself? No   Do you have difficulty using the toilet? No   Do you have difficulty moving around from place to place? No   Do you have trouble with steps or getting out of a bed or a chair? No   Current Diet Well Balanced Diet   Dental Exam Up to date   Eye Exam Up to date   Exercise (times per week) 7 times per week   Current Exercises Include Walking;Yard Work   Do you need help using the phone?  No   Are you deaf or do you have serious difficulty hearing?  No   Do you need help to go to places out of walking distance? No   Do you need help shopping? No   Do you need help preparing meals?  No   Do you need help with housework?  No   Do you need help with laundry? No   Do you need help taking your medications? No   Do you need help managing money? No   Do you ever drive or ride in a car without wearing a seat belt? No   Have you felt unusual stress, anger or loneliness in the last month? No   Who do you live with? Alone   If you need help, do you have trouble finding someone available to you? No   Have you been bothered in the last four weeks by sexual problems? No   Do you have difficulty concentrating, remembering or making decisions? No           Age-appropriate Screening Schedule:  Refer to the list below for future  screening recommendations based on patient's age, sex and/or medical conditions. Orders for these recommended tests are listed in the plan section. The patient has been provided with a written plan.    Health Maintenance List  Health Maintenance   Topic Date Due    TDAP/TD VACCINES (1 - Tdap) Never done    ZOSTER VACCINE (1 of 2) Never done    AAA SCREEN ONCE  Never done    COVID-19 Vaccine (3 - 2024-25 season) 06/10/2025 (Originally 9/1/2024)    Pneumococcal Vaccine 50+ (1 of 2 - PCV) 11/23/2025 (Originally 1/14/1979)    INFLUENZA VACCINE  07/01/2025    LUNG CANCER SCREENING  12/20/2025    LIPID PANEL  03/24/2026    ANNUAL WELLNESS VISIT  05/27/2026    COLORECTAL CANCER SCREENING  12/02/2027    HEPATITIS C SCREENING  Completed                                                                                                                                                CMS Preventative Services Quick Reference  Risk Factors Identified During Encounter  Fall Risk-High or Moderate: Discussed Fall Prevention in the home    The above risks/problems have been discussed with the patient.  Pertinent information has been shared with the patient in the After Visit Summary.  An After Visit Summary and PPPS were made available to the patient.    Follow Up:   Next Medicare Wellness visit to be scheduled in 1 year.          Additional E&M Note during same encounter follows:  Patient has multiple medical problems which are significant and separately identifiable that require additional work above and beyond the Medicare Wellness Visit.      Chief Complaint  Medicare Wellness-subsequent and Back Pain (Pt reports pain d/t sciatica. States that his pain is much better today, has been managing pain with OTC pain relievers.)    Vijay Mathew is a 65 y.o. male who presents to [facility]     History of Present Illness      Objective   Vital Signs:   Vitals:    05/27/25 1506 05/27/25 1509   BP: 158/70  "152/75   BP Location: Left arm Right arm   Patient Position: Sitting Sitting   Cuff Size: Adult Adult   Pulse: 73    Resp: 17    Temp: 98.4 °F (36.9 °C)    TempSrc: Temporal    SpO2: 98%    Weight: 75.8 kg (167 lb)    Height: 170.2 cm (67.01\")    PainSc: 0-No pain        Wt Readings from Last 3 Encounters:   05/27/25 75.8 kg (167 lb)   03/07/25 77.7 kg (171 lb 3.2 oz)   11/25/24 75 kg (165 lb 4.8 oz)     BP Readings from Last 3 Encounters:   05/27/25 152/75   03/07/25 138/76   11/25/24 118/61       Physical Exam  Vitals reviewed.   Constitutional:       Appearance: He is well-developed and overweight.   HENT:      Head: Normocephalic and atraumatic.      Right Ear: External ear normal.      Left Ear: External ear normal.      Mouth/Throat:      Pharynx: No oropharyngeal exudate.   Eyes:      Conjunctiva/sclera: Conjunctivae normal.      Pupils: Pupils are equal, round, and reactive to light.   Neck:      Vascular: No carotid bruit.   Cardiovascular:      Rate and Rhythm: Normal rate and regular rhythm.      Heart sounds: No murmur heard.     No friction rub. No gallop.   Pulmonary:      Effort: Pulmonary effort is normal.      Breath sounds: Normal breath sounds. No wheezing or rhonchi.   Abdominal:      General: There is no distension.   Skin:     General: Skin is warm and dry.   Neurological:      Mental Status: He is alert and oriented to person, place, and time.      Cranial Nerves: No cranial nerve deficit.      Motor: No weakness.   Psychiatric:         Mood and Affect: Mood and affect normal.         Behavior: Behavior normal.         Thought Content: Thought content normal.         Judgment: Judgment normal.           The following data was reviewed by Amaya Robbins DO on 05/27/2025  CMP   CMP          3/24/2025    15:45   CMP   Glucose 82    BUN 14    Creatinine 1.01    EGFR 82.0    Sodium 139    Potassium 3.7    Chloride 105    Calcium 9.4    Total Protein 6.9    Albumin 4.2    Globulin 2.7    Total " Bilirubin 0.4    Alkaline Phosphatase 86    AST (SGOT) 27    ALT (SGPT) 21    Albumin/Globulin Ratio 1.6    BUN/Creatinine Ratio 13.9    Anion Gap 8.5              Assessment & Plan   Diagnoses and all orders for this visit:    1. Medicare annual wellness visit, subsequent (Primary)    2. Primary hypertension  Assessment & Plan:  Hypertension is stable and controlled  Continue current treatment regimen.  Dietary sodium restriction.  Weight loss.  Stop smoking.  Blood pressure will be reassessed in 6 months.      3. Mixed hyperlipidemia  Assessment & Plan:   Lipid abnormalities are improving with treatment    Plan:  Continue same medication/s without change.      Discussed medication dosage, use, side effects, and goals of treatment in detail.    Counseled patient on lifestyle modifications to help control hyperlipidemia.   Cholesterol lowering dietary information shared with patient.    Patient Treatment Goals:   LDL goal is under 100    Followup in 6 months.    The 10-year ASCVD risk score (Qian AMBROSE, et al., 2019) is: 27.2%    Values used to calculate the score:      Age: 65 years      Sex: Male      Is Non- : No      Diabetic: No      Tobacco smoker: Yes      Systolic Blood Pressure: 152 mmHg      Is BP treated: Yes      HDL Cholesterol: 35 mg/dL      Total Cholesterol: 145 mg/dL        4. COPD, mild  Assessment & Plan:  COPD is stable.    Plan:  Continue same medication/s without change.    Discussed medication dosage, use, side effects, and goals of treatment in detail.    Discussed monitoring symptoms and use of quick-relief medications and maintenance medication..    Patient Treatment Goals:   symptom prevention, minimizing limitation in activity, prevention of exacerbations and use of ER/inpatient care, maintenance of optimal pulmonary function, and minimization of adverse effects of treatment    Followup in 6 months.        5. Cigarette nicotine dependence with nicotine-induced  disorder  Assessment & Plan:  Tobacco use is unchanged.  Smoking cessation counseling was provided.  Tobacco use will be reassessed in 6 months.                         BMI is >= 25 and <30. (Overweight) The following options were offered after discussion;: nutrition counseling/recommendations       FOLLOW UP  Return in about 6 months (around 11/27/2025).  Patient was given instructions and counseling regarding his condition or for health maintenance advice. Please see specific information pulled into the AVS if appropriate.     Patient or patient representative verbalized consent for the use of Ambient Listening during the visit with  Amaya Robbins DO for chart documentation. 5/29/2025  15:23 EDT    Amaya Robbins DO  05/29/25  07:47 EDT

## 2025-05-29 NOTE — ASSESSMENT & PLAN NOTE
Lipid abnormalities are improving with treatment    Plan:  Continue same medication/s without change.      Discussed medication dosage, use, side effects, and goals of treatment in detail.    Counseled patient on lifestyle modifications to help control hyperlipidemia.   Cholesterol lowering dietary information shared with patient.    Patient Treatment Goals:   LDL goal is under 100    Followup in 6 months.    The 10-year ASCVD risk score (Qian AMBROSE, et al., 2019) is: 27.2%    Values used to calculate the score:      Age: 65 years      Sex: Male      Is Non- : No      Diabetic: No      Tobacco smoker: Yes      Systolic Blood Pressure: 152 mmHg      Is BP treated: Yes      HDL Cholesterol: 35 mg/dL      Total Cholesterol: 145 mg/dL

## 2025-06-27 RX ORDER — ATORVASTATIN CALCIUM 20 MG/1
20 TABLET, FILM COATED ORAL DAILY
Qty: 90 TABLET | Refills: 0 | Status: SHIPPED | OUTPATIENT
Start: 2025-06-27

## 2025-06-27 RX ORDER — LOSARTAN POTASSIUM 50 MG/1
50 TABLET ORAL DAILY
Qty: 90 TABLET | Refills: 0 | Status: SHIPPED | OUTPATIENT
Start: 2025-06-27

## 2025-08-01 NOTE — TELEPHONE ENCOUNTER
Rx Refill Note  Requested Prescriptions     Pending Prescriptions Disp Refills    Eliquis 5 MG tablet tablet [Pharmacy Med Name: ELIQUIS 5 MG TABLET] 120 tablet 2     Sig: TAKE 1 TABLET BY MOUTH EVERY 12 HOURS FOR HEART        LAST OFFICE VISIT:  3/7/2025     NEXT OFFICE VISIT:  NO F/U SCHEDULED - PT IS TO RETURN IN 6 MOS FROM MARCH 2025    Does the medication requests match the last office note:    [x] Yes   [] No    Does this refill request meet protocol details for MA to approve:     [x] Yes   [] No   [] No Protocols Provided

## 2025-08-08 RX ORDER — ATORVASTATIN CALCIUM 20 MG/1
20 TABLET, FILM COATED ORAL DAILY
Qty: 90 TABLET | Refills: 0 | Status: SHIPPED | OUTPATIENT
Start: 2025-08-08

## 2025-08-08 RX ORDER — LOSARTAN POTASSIUM 50 MG/1
50 TABLET ORAL DAILY
Qty: 90 TABLET | Refills: 0 | Status: SHIPPED | OUTPATIENT
Start: 2025-08-08

## (undated) DEVICE — VESSEL SEALER EXTEND: Brand: ENDOWRIST

## (undated) DEVICE — PENCL E/S SMOKEEVAC W/TELESCP CANN

## (undated) DEVICE — GLV SURG BIOGEL LTX PF 7 1/2

## (undated) DEVICE — MAJOR-LF: Brand: MEDLINE INDUSTRIES, INC.

## (undated) DEVICE — SOL IRR NACL 0.9PCT BT 1000ML

## (undated) DEVICE — 2, DISPOSABLE SUCTION/IRRIGATOR WITHOUT DISPOSABLE TIP: Brand: STRYKEFLOW

## (undated) DEVICE — SUT MNCRYL PLS ANTIB UD 4/0 PS2 18IN

## (undated) DEVICE — 7MM FLAT DRAIN W/TRO FULL PERF: Brand: CARDINAL HEALTH

## (undated) DEVICE — ENDOPATH PNEUMONEEDLE INSUFFLATION NEEDLES WITH LUER LOCK CONNECTORS 120MM: Brand: ENDOPATH

## (undated) DEVICE — 3 RING SUTURE PASSER - 16 CM: Brand: 3 RING SUTURE PASSER - 16 CM

## (undated) DEVICE — JP 400CC KIT W/10FR SIL W/TRO: Brand: CARDINAL HEALTH

## (undated) DEVICE — ANTIBACTERIAL VIOLET BRAIDED (POLYGLACTIN 910), SYNTHETIC ABSORBABLE SUTURE: Brand: COATED VICRYL

## (undated) DEVICE — SYR LL TP 10ML STRL

## (undated) DEVICE — SUT ETHLN 3-0 FS118IN 663H

## (undated) DEVICE — INTENDED FOR TISSUE SEPARATION, AND OTHER PROCEDURES THAT REQUIRE A SHARP SURGICAL BLADE TO PUNCTURE OR CUT.: Brand: BARD-PARKER ® CARBON RIB-BACK BLADES

## (undated) DEVICE — VISUALIZATION SYSTEM: Brand: CLEARIFY

## (undated) DEVICE — JACKSON-PRATT 100CC BULB RESERVOIR: Brand: CARDINAL HEALTH

## (undated) DEVICE — ARM DRAPE

## (undated) DEVICE — APPL CHLORAPREP HI/LITE 26ML ORNG

## (undated) DEVICE — CATH IV ANGIO FEP 14GA 3.25IN ORNG 10PK

## (undated) DEVICE — VAGINAL PREP TRAY: Brand: MEDLINE INDUSTRIES, INC.

## (undated) DEVICE — SUT MNCRYL 4/0 PS2 18 IN

## (undated) DEVICE — 3M™ IOBAN™ 2 ANTIMICROBIAL INCISE DRAPE 6650EZ: Brand: IOBAN™ 2

## (undated) DEVICE — REDUCER: Brand: ENDOWRIST

## (undated) DEVICE — SEAL

## (undated) DEVICE — DAVINCI-LF: Brand: MEDLINE INDUSTRIES, INC.

## (undated) DEVICE — GOWN,REINFORCE,POLY,SIRUS,BREATH SLV,XLG: Brand: MEDLINE

## (undated) DEVICE — TIP COVER ACCESSORY

## (undated) DEVICE — 3M™ STERI-STRIP™ REINFORCED ADHESIVE SKIN CLOSURES, R1547, 1/2 IN X 4 IN (12 MM X 100 MM), 6 STRIPS/ENVELOPE: Brand: 3M™ STERI-STRIP™

## (undated) DEVICE — DRSNG WND GZ CURAD OIL EMULSION 3X3IN STRL

## (undated) DEVICE — CANNULA SEAL

## (undated) DEVICE — SLV SCD KN/LEN ADJ EXPRSS BLENDED MD 1P/U

## (undated) DEVICE — SUT VIC 3/0 SH 27IN J416H